# Patient Record
Sex: FEMALE | Race: ASIAN | NOT HISPANIC OR LATINO | Employment: STUDENT | URBAN - METROPOLITAN AREA
[De-identification: names, ages, dates, MRNs, and addresses within clinical notes are randomized per-mention and may not be internally consistent; named-entity substitution may affect disease eponyms.]

---
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
## 2021-02-26 ENCOUNTER — OFFICE VISIT (OUTPATIENT)
Dept: FAMILY MEDICINE | Facility: CLINIC | Age: 21
End: 2021-02-26
Payer: COMMERCIAL

## 2021-02-26 VITALS
SYSTOLIC BLOOD PRESSURE: 106 MMHG | HEART RATE: 74 BPM | WEIGHT: 89.56 LBS | OXYGEN SATURATION: 98 % | HEIGHT: 61 IN | BODY MASS INDEX: 16.91 KG/M2 | TEMPERATURE: 98.2 F | DIASTOLIC BLOOD PRESSURE: 70 MMHG

## 2021-02-26 DIAGNOSIS — F32.1 MAJOR DEPRESSIVE DISORDER, SINGLE EPISODE, MODERATE (H): ICD-10-CM

## 2021-02-26 DIAGNOSIS — Z23 NEED FOR PROPHYLACTIC VACCINATION AND INOCULATION AGAINST INFLUENZA: ICD-10-CM

## 2021-02-26 DIAGNOSIS — Z11.59 ENCOUNTER FOR HEPATITIS C SCREENING TEST FOR LOW RISK PATIENT: ICD-10-CM

## 2021-02-26 DIAGNOSIS — Z00.00 ROUTINE GENERAL MEDICAL EXAMINATION AT A HEALTH CARE FACILITY: Primary | ICD-10-CM

## 2021-02-26 DIAGNOSIS — R63.6 UNDERWEIGHT: ICD-10-CM

## 2021-02-26 DIAGNOSIS — Z11.4 ENCOUNTER FOR SCREENING FOR HIV: ICD-10-CM

## 2021-02-26 DIAGNOSIS — Z11.1 SCREENING EXAMINATION FOR PULMONARY TUBERCULOSIS: ICD-10-CM

## 2021-02-26 LAB
HCV AB SERPL QL IA: NONREACTIVE
HIV 1+2 AB+HIV1 P24 AG SERPL QL IA: NONREACTIVE

## 2021-02-26 PROCEDURE — 36415 COLL VENOUS BLD VENIPUNCTURE: CPT | Performed by: NURSE PRACTITIONER

## 2021-02-26 PROCEDURE — 99385 PREV VISIT NEW AGE 18-39: CPT | Mod: 25 | Performed by: NURSE PRACTITIONER

## 2021-02-26 PROCEDURE — 86481 TB AG RESPONSE T-CELL SUSP: CPT | Performed by: NURSE PRACTITIONER

## 2021-02-26 PROCEDURE — 86803 HEPATITIS C AB TEST: CPT | Performed by: NURSE PRACTITIONER

## 2021-02-26 PROCEDURE — 90686 IIV4 VACC NO PRSV 0.5 ML IM: CPT | Performed by: NURSE PRACTITIONER

## 2021-02-26 PROCEDURE — 90471 IMMUNIZATION ADMIN: CPT | Performed by: NURSE PRACTITIONER

## 2021-02-26 PROCEDURE — 87389 HIV-1 AG W/HIV-1&-2 AB AG IA: CPT | Performed by: NURSE PRACTITIONER

## 2021-02-26 ASSESSMENT — ENCOUNTER SYMPTOMS
PALPITATIONS: 0
HEMATOCHEZIA: 0
HEMATURIA: 0
EYE PAIN: 0
FEVER: 0
DYSURIA: 0
DIZZINESS: 0
CHILLS: 0
HEADACHES: 0
MYALGIAS: 0
CONSTIPATION: 0
SHORTNESS OF BREATH: 0
WEAKNESS: 0
SORE THROAT: 0
COUGH: 0
DIARRHEA: 0
ABDOMINAL PAIN: 0
NAUSEA: 0
ARTHRALGIAS: 0
FREQUENCY: 0
HEARTBURN: 0
JOINT SWELLING: 0
PARESTHESIAS: 0
NERVOUS/ANXIOUS: 1

## 2021-02-26 ASSESSMENT — PATIENT HEALTH QUESTIONNAIRE - PHQ9
10. IF YOU CHECKED OFF ANY PROBLEMS, HOW DIFFICULT HAVE THESE PROBLEMS MADE IT FOR YOU TO DO YOUR WORK, TAKE CARE OF THINGS AT HOME, OR GET ALONG WITH OTHER PEOPLE: SOMEWHAT DIFFICULT
SUM OF ALL RESPONSES TO PHQ QUESTIONS 1-9: 7
SUM OF ALL RESPONSES TO PHQ QUESTIONS 1-9: 7

## 2021-02-26 ASSESSMENT — ANXIETY QUESTIONNAIRES
7. FEELING AFRAID AS IF SOMETHING AWFUL MIGHT HAPPEN: SEVERAL DAYS
1. FEELING NERVOUS, ANXIOUS, OR ON EDGE: MORE THAN HALF THE DAYS
GAD7 TOTAL SCORE: 10
GAD7 TOTAL SCORE: 10
2. NOT BEING ABLE TO STOP OR CONTROL WORRYING: MORE THAN HALF THE DAYS
4. TROUBLE RELAXING: SEVERAL DAYS
6. BECOMING EASILY ANNOYED OR IRRITABLE: SEVERAL DAYS
GAD7 TOTAL SCORE: 10
7. FEELING AFRAID AS IF SOMETHING AWFUL MIGHT HAPPEN: SEVERAL DAYS
3. WORRYING TOO MUCH ABOUT DIFFERENT THINGS: MORE THAN HALF THE DAYS
5. BEING SO RESTLESS THAT IT IS HARD TO SIT STILL: SEVERAL DAYS

## 2021-02-26 ASSESSMENT — MIFFLIN-ST. JEOR: SCORE: 1118.63
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
## 2021-02-26 NOTE — LETTER
My Depression Action Plan  Name: Mery Doyle   Date of Birth 2000  Date: 2/26/2021    My doctor: [facility]   My clinic: [address] [address] [phone]          GREEN    ZONE   Good Control    What it looks like:     Things are going generally well. You have normal ups and downs. You may even feel depressed from time to time, but bad moods usually last less than a day.   What you need to do:  1. Continue to care for yourself (see self care plan)  2. Check your depression survival kit and update it as needed  3. Follow your physician s recommendations including any medication.  4. Do not stop taking medication unless you consult with your physician first.           YELLOW         ZONE Getting Worse    What it looks like:     Depression is starting to interfere with your life.     It may be hard to get out of bed; you may be starting to isolate yourself from others.    Symptoms of depression are starting to last most all day and this has happened for several days.     You may have suicidal thoughts but they are not constant.   What you need to do:     1. Call your care team. Your response to treatment will improve if you keep your care team informed of your progress. Yellow periods are signs an adjustment may need to be made.     2. Continue your self-care.  Just get dressed and ready for the day.  Don't give yourself time to talk yourself out of it.    3. Talk to someone in your support network.    4. Open up your Depression Self-Care Plan/Wellness Kit.           RED    ZONE Medical Alert - Get Help    What it looks like:     Depression is seriously interfering with your life.     You may experience these or other symptoms: You can t get out of bed most days, can t work or engage in other necessary activities, you have trouble taking care of basic hygiene, or basic responsibilities, thoughts of suicide or  death that will not go away, self-injurious behavior.     What you need to do:  1. Call your care team and request a same-day appointment. If they are not available (weekends or after hours) call your local crisis line, emergency room or 911.          Depression Self-Care Plan / Wellness Kit    Many people find that medication and therapy are helpful treatments for managing depression. In addition, making small changes to your everyday life can help to boost your mood and improve your wellbeing. Below are some tips for you to consider. Be sure to talk with your medical provider and/or behavioral health consultant if your symptoms are worsening or not improving.     Sleep   Sleep hygiene  means all of the habits that support good, restful sleep. It includes maintaining a consistent bedtime and wake time, using your bedroom only for sleeping or sex, and keeping the bedroom dark and free of distractions like a computer, smartphone, or television.     Develop a Healthy Routine  Maintain good hygiene. Get out of bed in the morning, make your bed, brush your teeth, take a shower, and get dressed. Don t spend too much time viewing media that makes you feel stressed. Find time to relax each day.    Exercise  Get some form of exercise every day. This will help reduce pain and release endorphins, the  feel good  chemicals in your brain. It can be as simple as just going for a walk or doing some gardening, anything that will get you moving.      Diet  Strive to eat healthy foods, including fruits and vegetables. Drink plenty of water. Avoid excessive sugar, caffeine, alcohol, and other mood-altering substances.     Stay Connected with Others  Stay in touch with friends and family members.    Manage Your Mood  Try deep breathing, massage therapy, biofeedback, or meditation. Take part in fun activities when you can. Try to find something to smile about each day.     Psychotherapy  Be open to working with a therapist if your  provider recommends it.     Medication  Be sure to take your medication as prescribed. Most anti-depressants need to be taken every day. It usually takes several weeks for medications to work. Not all medicines work for all people. It is important to follow-up with your provider to make sure you have a treatment plan that is working for you. Do not stop your medication abruptly without first discussing it with your provider.    Crisis Resources   These hotlines are for both adults and children. They and are open 24 hours a day, 7 days a week unless noted otherwise.      National Suicide Prevention Lifeline   3-281-673-WFHL (4960)      Crisis Text Line    www.crisistextline.org  Text HOME to 744047 from anywhere in the [place], anytime, about any type of crisis. A live, trained crisis counselor will receive the text and respond quickly.      [place] Lifeline for LGBTQ Youth  A national crisis intervention and suicide lifeline for LGBTQ youth under 25. Provides a safe place to talk without judgement. Call 1-599.250.7610; text START to 775631 or visit www.thetrevorproject.org to talk to a trained counselor.      For [facility] crisis numbers, visit the [facility] website at:  https://[place].gov/dhs/people-we-serve/adults/health-care/mental-health/resources/crisis-contacts.jsp

## 2021-02-26 NOTE — LETTER
My Asthma Action Plan    Name: Mery Doyle   YOB: 2000  Date: 2/26/2021   My doctor: MAI Mujica CNP   My clinic: [facility]        My Rescue Medicine:   Albuterol inhaler (Proair/Ventolin/Proventil HFA)  2-4 puffs EVERY 4 HOURS as needed. Use a spacer if recommended by your provider.   My Asthma Severity:   Intermittent / Exercise Induced  Know your asthma triggers: none             GREEN ZONE   Good Control    I feel good    No cough or wheeze    Can work, sleep and play without asthma symptoms       Take your asthma control medicine every day.     1. If exercise triggers your asthma, take your rescue medication    15 minutes before exercise or sports, and    During exercise if you have asthma symptoms  2. Spacer to use with inhaler: If you have a spacer, make sure to use it with your inhaler             YELLOW ZONE Getting Worse  I have ANY of these:    I do not feel good    Cough or wheeze    Chest feels tight    Wake up at night   1. Keep taking your Green Zone medications  2. Start taking your rescue medicine:    every 20 minutes for up to 1 hour. Then every 4 hours for 24-48 hours.  3. If you stay in the Yellow Zone for more than 12-24 hours, contact your doctor.  4. If you do not return to the Green Zone in 12-24 hours or you get worse, start taking your oral steroid medicine if prescribed by your provider.           RED ZONE Medical Alert - Get Help  I have ANY of these:    I feel awful    Medicine is not helping    Breathing getting harder    Trouble walking or talking    Nose opens wide to breathe       1. Take your rescue medicine NOW  2. If your provider has prescribed an oral steroid medicine, start taking it NOW  3. Call your doctor NOW  4. If you are still in the Red Zone after 20 minutes and you have not reached your doctor:    Take your rescue medicine again and    Call 911 or go to the emergency room right away    See your regular doctor within 2  weeks of an Emergency Room or Urgent Care visit for follow-up treatment.          Annual Reminders:  Meet with Asthma Educator,  Flu Shot in the Fall, consider Pneumonia Vaccination for patients with asthma (aged 19 and older).    Pharmacy:    [id number] - [place], [place] - [address] [facility] [place] [address] [facility] [place], [place] [address] [facility] [address]  [id number] - [facility] [address] AT [facility] [address]    Electronically signed by MAI Mujica CNP   Date: 02/26/21                    Asthma Triggers  How To Control Things That Make Your Asthma Worse    Triggers are things that make your asthma worse.  Look at the list below to help you find your triggers and   what you can do about them. You can help prevent asthma flare-ups by staying away from your triggers.      Trigger                                                          What you can do   Cigarette Smoke  Tobacco smoke can make asthma worse. Do not allow smoking in your home, car or around you.  Be sure no one smokes at a child s day care or school.  If you smoke, ask your health care provider for ways to help you quit.  Ask family members to quit too.  Ask your health care provider for a referral to Quit Plan to help you quit smoking, or call 3-680-948-PLAN.     Colds, Flu, Bronchitis  These are common triggers of asthma. Wash your hands often.  Don t touch your eyes, nose or mouth.  Get a flu shot every year.     Dust Mites  These are tiny bugs that live in cloth or carpet. They are too small to see. Wash sheets and blankets in hot water every week.   Encase pillows and mattress in dust mite proof covers.  Avoid having carpet if you can. If you have carpet, vacuum weekly.   Use a dust mask and HEPA vacuum.   Pollen and Outdoor Mold  Some people are allergic to trees, grass, or weed pollen, or molds. Try to keep your windows  closed.  Limit time out doors when pollen count is high.   Ask you health care provider about taking medicine during allergy season.     Animal Dander  Some people are allergic to skin flakes, urine or saliva from pets with fur or feathers. Keep pets with fur or feathers out of your home.    If you can t keep the pet outdoors, then keep the pet out of your bedroom.  Keep the bedroom door closed.  Keep pets off cloth furniture and away from stuffed toys.     Mice, Rats, and Cockroaches  Some people are allergic to the waste from these pests.   Cover food and garbage.  Clean up spills and food crumbs.  Store grease in the refrigerator.   Keep food out of the bedroom.   Indoor Mold  This can be a trigger if your home has high moisture. Fix leaking faucets, pipes, or other sources of water.   Clean moldy surfaces.  Dehumidify basement if it is damp and smelly.   Smoke, Strong Odors, and Sprays  These can reduce air quality. Stay away from strong odors and sprays, such as perfume, powder, hair spray, paints, smoke incense, paint, cleaning products, candles and new carpet.   Exercise or Sports  Some people with asthma have this trigger. Be active!  Ask your doctor about taking medicine before sports or exercise to prevent symptoms.    Warm up for 5-10 minutes before and after sports or exercise.     Other Triggers of Asthma  Cold air:  Cover your nose and mouth with a scarf.  Sometimes laughing or crying can be a trigger.  Some medicines and food can trigger asthma.

## 2021-02-27 ASSESSMENT — ANXIETY QUESTIONNAIRES: GAD7 TOTAL SCORE: 10

## 2021-02-27 ASSESSMENT — ASTHMA QUESTIONNAIRES: ACT_TOTALSCORE: 25

## 2021-03-01 LAB
GAMMA INTERFERON BACKGROUND BLD IA-ACNC: 0.2 IU/ML
M TB IFN-G CD4+ BCKGRND COR BLD-ACNC: 9.8 IU/ML
M TB TUBERC IFN-G BLD QL: NEGATIVE
MITOGEN IGNF BCKGRD COR BLD-ACNC: 0 IU/ML
MITOGEN IGNF BCKGRD COR BLD-ACNC: 0 IU/ML

## 2021-03-11 ENCOUNTER — OFFICE VISIT (OUTPATIENT)
Dept: FAMILY MEDICINE | Facility: CLINIC | Age: 21
End: 2021-03-11
Payer: COMMERCIAL

## 2021-03-11 VITALS
OXYGEN SATURATION: 100 % | WEIGHT: 91.38 LBS | TEMPERATURE: 98.8 F | SYSTOLIC BLOOD PRESSURE: 110 MMHG | BODY MASS INDEX: 17.27 KG/M2 | DIASTOLIC BLOOD PRESSURE: 76 MMHG | HEART RATE: 74 BPM

## 2021-03-11 DIAGNOSIS — Z01.84 IMMUNITY STATUS TESTING: Primary | ICD-10-CM

## 2021-03-11 LAB
HBV CORE AB SERPL QL IA: NONREACTIVE
HBV SURFACE AB SERPL IA-ACNC: 3.21 M[IU]/ML
HBV SURFACE AG SERPL QL IA: NONREACTIVE

## 2021-03-11 PROCEDURE — 99213 OFFICE O/P EST LOW 20 MIN: CPT | Performed by: PHYSICIAN ASSISTANT

## 2021-03-11 PROCEDURE — 99000 SPECIMEN HANDLING OFFICE-LAB: CPT | Performed by: PHYSICIAN ASSISTANT

## 2021-03-11 PROCEDURE — 87340 HEPATITIS B SURFACE AG IA: CPT | Performed by: PHYSICIAN ASSISTANT

## 2021-03-11 PROCEDURE — 86762 RUBELLA ANTIBODY: CPT | Performed by: PHYSICIAN ASSISTANT

## 2021-03-11 PROCEDURE — 86765 RUBEOLA ANTIBODY: CPT | Performed by: PHYSICIAN ASSISTANT

## 2021-03-11 PROCEDURE — 86735 MUMPS ANTIBODY: CPT | Mod: 90 | Performed by: PHYSICIAN ASSISTANT

## 2021-03-11 PROCEDURE — 86706 HEP B SURFACE ANTIBODY: CPT | Performed by: PHYSICIAN ASSISTANT

## 2021-03-11 PROCEDURE — 36415 COLL VENOUS BLD VENIPUNCTURE: CPT | Performed by: PHYSICIAN ASSISTANT

## 2021-03-11 PROCEDURE — 86704 HEP B CORE ANTIBODY TOTAL: CPT | Performed by: PHYSICIAN ASSISTANT

## 2021-03-11 NOTE — PROGRESS NOTES
Assessment & Plan     Immunity status testing    Immune testing ordered.    - Hepatitis B Surface Antibody  - Hepatitis B core antibody  - Hepatitis B surface antigen  - Rubella Antibody IgG Quantitative  - Rubeola Antibody IgG  - Mumps Immune Status, IgG             There are no Patient Instructions on file for this visit.    No follow-ups on file.    YELITZA Duque [facility] IDRIS Ordonez is a 20 year old who presents for the following health issues     HPI       Pt needs titers done to prove immunity to hep b, and mmr. She has vaccine records but school needs blood tests to prove immunity.        Review of Systems   Constitutional, HEENT, cardiovascular, pulmonary, gi and gu systems are negative, except as otherwise noted.      Objective    /76 (BP Location: Right arm, Patient Position: Chair, Cuff Size: Adult Small)   Pulse 74   Temp 98.8  F (37.1  C) (Oral)   Wt 41.4 kg (91 lb 6 oz)   SpO2 100%   BMI 17.27 kg/m    Body mass index is 17.27 kg/m .       Physical Exam   GENERAL: healthy, alert and no distress  EYES: Eyes grossly normal to inspection, PERRL and conjunctivae and sclerae normal  MS: no gross musculoskeletal defects noted, no edema  SKIN: no suspicious lesions or rashes  NEURO: Normal strength and tone, mentation intact and speech normal  PSYCH: mentation appears normal, affect normal/bright    Labs pending.

## 2021-03-12 LAB
MEV IGG SER QL IA: 4.4 AI (ref 0–0.8)
MUV IGG SER QL IA: 2.6 AI (ref 0–0.8)
RUBV IGG SERPL IA-ACNC: 33 IU/ML

## 2021-03-15 ENCOUNTER — ALLIED HEALTH/NURSE VISIT (OUTPATIENT)
Dept: FAMILY MEDICINE | Facility: CLINIC | Age: 21
End: 2021-03-15
Payer: COMMERCIAL

## 2021-03-15 DIAGNOSIS — Z23 NEED FOR HEPATITIS B BOOSTER VACCINATION: Primary | ICD-10-CM

## 2021-03-15 PROCEDURE — 99207 PR NO CHARGE NURSE ONLY: CPT

## 2021-03-15 PROCEDURE — 90471 IMMUNIZATION ADMIN: CPT

## 2021-03-15 PROCEDURE — 90744 HEPB VACC 3 DOSE PED/ADOL IM: CPT

## 2021-09-18 ENCOUNTER — HEALTH MAINTENANCE LETTER (OUTPATIENT)
Age: 21
End: 2021-09-18

## 2022-04-30 ENCOUNTER — HEALTH MAINTENANCE LETTER (OUTPATIENT)
Age: 22
End: 2022-04-30

## 2022-07-13 ENCOUNTER — TELEPHONE (OUTPATIENT)
Dept: FAMILY MEDICINE | Facility: CLINIC | Age: 22
End: 2022-07-13

## 2022-07-13 NOTE — TELEPHONE ENCOUNTER
Patient Quality Outreach    Patient is due for the following:   Depression  -  PHQ-9 Needed    NEXT STEPS:   Schedule a yearly physical    Type of outreach:    Sent Webcom message.      Questions for provider review:    None     Sandra Aguilar, CMA

## 2022-09-20 ENCOUNTER — OFFICE VISIT (OUTPATIENT)
Dept: FAMILY MEDICINE | Facility: CLINIC | Age: 22
End: 2022-09-20
Payer: COMMERCIAL

## 2022-09-20 VITALS
SYSTOLIC BLOOD PRESSURE: 112 MMHG | OXYGEN SATURATION: 98 % | HEART RATE: 96 BPM | WEIGHT: 92 LBS | RESPIRATION RATE: 16 BRPM | TEMPERATURE: 98.8 F | BODY MASS INDEX: 17.38 KG/M2 | DIASTOLIC BLOOD PRESSURE: 74 MMHG

## 2022-09-20 DIAGNOSIS — E55.9 VITAMIN D DEFICIENCY: ICD-10-CM

## 2022-09-20 DIAGNOSIS — F41.1 GAD (GENERALIZED ANXIETY DISORDER): ICD-10-CM

## 2022-09-20 DIAGNOSIS — F32.1 MAJOR DEPRESSIVE DISORDER, SINGLE EPISODE, MODERATE (H): Primary | ICD-10-CM

## 2022-09-20 LAB
ALBUMIN SERPL-MCNC: 4.3 G/DL (ref 3.4–5)
ALP SERPL-CCNC: 52 U/L (ref 40–150)
ALT SERPL W P-5'-P-CCNC: 20 U/L (ref 0–50)
ANION GAP SERPL CALCULATED.3IONS-SCNC: 7 MMOL/L (ref 3–14)
AST SERPL W P-5'-P-CCNC: 15 U/L (ref 0–45)
BILIRUB SERPL-MCNC: 0.3 MG/DL (ref 0.2–1.3)
BUN SERPL-MCNC: 15 MG/DL (ref 7–30)
CALCIUM SERPL-MCNC: 8.9 MG/DL (ref 8.5–10.1)
CHLORIDE BLD-SCNC: 108 MMOL/L (ref 94–109)
CO2 SERPL-SCNC: 23 MMOL/L (ref 20–32)
CREAT SERPL-MCNC: 0.51 MG/DL (ref 0.52–1.04)
DEPRECATED CALCIDIOL+CALCIFEROL SERPL-MC: 11 UG/L (ref 20–75)
ERYTHROCYTE [DISTWIDTH] IN BLOOD BY AUTOMATED COUNT: 12.5 % (ref 10–15)
GFR SERPL CREATININE-BSD FRML MDRD: >90 ML/MIN/1.73M2
GLUCOSE BLD-MCNC: 100 MG/DL (ref 70–99)
HCT VFR BLD AUTO: 42.4 % (ref 35–47)
HGB BLD-MCNC: 14.5 G/DL (ref 11.7–15.7)
MCH RBC QN AUTO: 30.6 PG (ref 26.5–33)
MCHC RBC AUTO-ENTMCNC: 34.2 G/DL (ref 31.5–36.5)
MCV RBC AUTO: 90 FL (ref 78–100)
PLATELET # BLD AUTO: 223 10E3/UL (ref 150–450)
POTASSIUM BLD-SCNC: 4.7 MMOL/L (ref 3.4–5.3)
PROT SERPL-MCNC: 7.8 G/DL (ref 6.8–8.8)
RBC # BLD AUTO: 4.74 10E6/UL (ref 3.8–5.2)
SODIUM SERPL-SCNC: 138 MMOL/L (ref 133–144)
TSH SERPL DL<=0.005 MIU/L-ACNC: 1.14 MU/L (ref 0.4–4)
WBC # BLD AUTO: 7.1 10E3/UL (ref 4–11)

## 2022-09-20 PROCEDURE — 80053 COMPREHEN METABOLIC PANEL: CPT | Performed by: NURSE PRACTITIONER

## 2022-09-20 PROCEDURE — 82306 VITAMIN D 25 HYDROXY: CPT | Performed by: NURSE PRACTITIONER

## 2022-09-20 PROCEDURE — 85027 COMPLETE CBC AUTOMATED: CPT | Performed by: NURSE PRACTITIONER

## 2022-09-20 PROCEDURE — 99214 OFFICE O/P EST MOD 30 MIN: CPT | Performed by: NURSE PRACTITIONER

## 2022-09-20 PROCEDURE — 36415 COLL VENOUS BLD VENIPUNCTURE: CPT | Performed by: NURSE PRACTITIONER

## 2022-09-20 PROCEDURE — 84443 ASSAY THYROID STIM HORMONE: CPT | Performed by: NURSE PRACTITIONER

## 2022-09-20 RX ORDER — VENLAFAXINE HYDROCHLORIDE 75 MG/1
75 CAPSULE, EXTENDED RELEASE ORAL DAILY
Qty: 30 CAPSULE | Refills: 0 | Status: SHIPPED | OUTPATIENT
Start: 2022-09-20 | End: 2022-10-03

## 2022-09-20 RX ORDER — VENLAFAXINE HYDROCHLORIDE 37.5 MG/1
37.5 CAPSULE, EXTENDED RELEASE ORAL DAILY
Qty: 7 CAPSULE | Refills: 0 | Status: SHIPPED | OUTPATIENT
Start: 2022-09-20 | End: 2022-11-11 | Stop reason: DRUGHIGH

## 2022-09-20 ASSESSMENT — ANXIETY QUESTIONNAIRES
GAD7 TOTAL SCORE: 15
5. BEING SO RESTLESS THAT IT IS HARD TO SIT STILL: NEARLY EVERY DAY
3. WORRYING TOO MUCH ABOUT DIFFERENT THINGS: MORE THAN HALF THE DAYS
IF YOU CHECKED OFF ANY PROBLEMS ON THIS QUESTIONNAIRE, HOW DIFFICULT HAVE THESE PROBLEMS MADE IT FOR YOU TO DO YOUR WORK, TAKE CARE OF THINGS AT HOME, OR GET ALONG WITH OTHER PEOPLE: VERY DIFFICULT
8. IF YOU CHECKED OFF ANY PROBLEMS, HOW DIFFICULT HAVE THESE MADE IT FOR YOU TO DO YOUR WORK, TAKE CARE OF THINGS AT HOME, OR GET ALONG WITH OTHER PEOPLE?: VERY DIFFICULT
7. FEELING AFRAID AS IF SOMETHING AWFUL MIGHT HAPPEN: MORE THAN HALF THE DAYS
GAD7 TOTAL SCORE: 15
2. NOT BEING ABLE TO STOP OR CONTROL WORRYING: MORE THAN HALF THE DAYS
4. TROUBLE RELAXING: NEARLY EVERY DAY
7. FEELING AFRAID AS IF SOMETHING AWFUL MIGHT HAPPEN: MORE THAN HALF THE DAYS
1. FEELING NERVOUS, ANXIOUS, OR ON EDGE: MORE THAN HALF THE DAYS
GAD7 TOTAL SCORE: 15
6. BECOMING EASILY ANNOYED OR IRRITABLE: SEVERAL DAYS

## 2022-09-20 ASSESSMENT — PATIENT HEALTH QUESTIONNAIRE - PHQ9
SUM OF ALL RESPONSES TO PHQ QUESTIONS 1-9: 17
SUM OF ALL RESPONSES TO PHQ QUESTIONS 1-9: 17
10. IF YOU CHECKED OFF ANY PROBLEMS, HOW DIFFICULT HAVE THESE PROBLEMS MADE IT FOR YOU TO DO YOUR WORK, TAKE CARE OF THINGS AT HOME, OR GET ALONG WITH OTHER PEOPLE: SOMEWHAT DIFFICULT

## 2022-09-20 NOTE — PROGRESS NOTES
Assessment & Plan     Major depressive disorder, single episode, moderate (H)  CRISTIAN (generalized anxiety disorder)  Uncontrolled exacerbation. Fluoxetine in past without good benefits. Will trial SNRI, venlafaxine started. Discussed medication use, risks, benefits, and side effects.   Passive SI, no self harm plans.   Discussed and accepts [facility]/Psychology.   Requests labs as below.   Crisis information discussed.   - venlafaxine (EFFEXOR XR) 37.5 MG 24 hr capsule  Dispense: 7 capsule; Refill: 0  - venlafaxine (EFFEXOR XR) 75 MG 24 hr capsule  Dispense: 30 capsule; Refill: 0  - [facility] Referral  - CBC with platelets  - TSH with free T4 reflex  - Vitamin D Deficiency  - Comprehensive metabolic panel (BMP + Alb, Alk Phos, ALT, AST, Total. Bili, TP)  - CBC with platelets  - TSH with free T4 reflex  - Vitamin D Deficiency  - Comprehensive metabolic panel (BMP + Alb, Alk Phos, ALT, AST, Total. Bili, TP)             Depression Screening Follow Up    PHQ 9/20/2022   PHQ-9 Total Score 17   Q9: Thoughts of better off dead/self-harm past 2 weeks Several days   F/U: Thoughts of suicide or self-harm Yes   F/U: Self harm-plan No   F/U: Self-harm action No   F/U: Safety concerns No       No follow-ups on file.   Follow-up Visit   Expected date:  Oct 20, 2022 (Approximate)      Follow Up Appointment Details:     Follow-up with whom?: Me    Follow-Up for what?: Chronic Disease f/u    Chronic Disease f/u:  Anxiety  Depression       How?: In Person or Virtual                    MAI Mujica [facility] IDRIS Ordonez is a 22 year old, presenting for the following health issues:  Recheck Medication      History of Present Illness       Mental Health Follow-up:  Patient presents to follow-up on Depression & Anxiety.Patient's depression since last visit has been:  Medium  The patient is having other symptoms associated with depression.  Patient's anxiety since last visit  has been:  Worse  The patient is having other symptoms associated with anxiety.  Any significant life events: No  Patient is feeling anxious or having panic attacks.  Patient has no concerns about alcohol or drug use.    Hypothyroidism:     Since last visit, patient describes the following symptoms::  Anxiety, Depression, Fatigue and Weight loss    Weight loss::  Less than 5 lbs.    She eats 0-1 servings of fruits and vegetables daily.She consumes 1 sweetened beverage(s) daily.She exercises with enough effort to increase her heart rate 60 or more minutes per day.  She exercises with enough effort to increase her heart rate 5 days per week.   She is taking medications regularly.    Today's PHQ-9         PHQ-9 Total Score: 17    PHQ-9 Q9 Thoughts of better off dead/self-harm past 2 weeks :   Several days  Thoughts of suicide or self harm: (P) Yes  Self-harm Plan:   (P) No  Self-harm Action:     (P) No  Safety concerns for self or others: (P) No    How difficult have these problems made it for you to do your work, take care of things at home, or get along with other people: Somewhat difficult  Today's CRISTIAN-7 Score: 15     PHQ 12/26/2017 2/26/2021 9/20/2022   PHQ-9 Total Score 0 7 17   Q9: Thoughts of better off dead/self-harm past 2 weeks Not at all Not at all Several days   F/U: Thoughts of suicide or self-harm - - Yes   F/U: Self harm-plan - - No   F/U: Self-harm action - - No   F/U: Safety concerns - - No     CRISTIAN-7 SCORE 12/26/2017 2/26/2021 9/20/2022   Total Score - 10 (moderate anxiety) 15 (severe anxiety)   Total Score 0 10 15           Review of Systems   Constitutional, HEENT, cardiovascular, pulmonary, gi and gu systems are negative, except as otherwise noted.      Objective    /74 (BP Location: Right arm, Patient Position: Chair, Cuff Size: Adult Regular)   Pulse 96   Temp 98.8  F (37.1  C) (Oral)   Resp 16   Wt 41.7 kg (92 lb)   SpO2 98%   BMI 17.38 kg/m    Body mass index is 17.38  kg/m .  Physical Exam   GENERAL: healthy, alert and no distress  PSYCH: mentation appears normal, affect flat and tearful

## 2022-09-20 NOTE — PATIENT INSTRUCTIONS
Referral to Behavioral Health Clinician (BHC)    During our visit, I encouraged you talk with our Behavioral Health Clinician (BHC). A BHC would be a great addition to your care team and will support your whole health!    What is a Behavioral Health Clinician (BHC)?    A BHC is a licensed mental health therapist. They can help you when behaviors, emotions, stress or worries get in the way of your life or health. Your BHC will work with you and your primary care team. Together, you'll come up with a unique care plan that works for you!         What will happen when I meet with a BHC?    BHCs ask questions to better understand your concerns. They will provide brief, solution-focused therapy. They can also make referrals to a specialty therapist or other services to help you reach your goals.      How do I schedule an appointment with the [facility]?    Call [phone] and ask for a C appointment.     How much time will I spend with the [facility]?     First visits are 45-60 minutes.  Return visits are typically 20-30 minutes.         How does billing work?      Outpatient mental health services are billed to insurance. Most plans don't charge a second co-pay if you see your primary care provider (PCP) the same day. Please verify your coverage and ask about your copay.

## 2022-09-21 RX ORDER — ERGOCALCIFEROL 1.25 MG/1
50000 CAPSULE, LIQUID FILLED ORAL WEEKLY
Qty: 8 CAPSULE | Refills: 0 | Status: SHIPPED | OUTPATIENT
Start: 2022-09-21 | End: 2022-11-10

## 2022-10-02 ENCOUNTER — MYC MEDICAL ADVICE (OUTPATIENT)
Dept: FAMILY MEDICINE | Facility: CLINIC | Age: 22
End: 2022-10-02

## 2022-10-02 DIAGNOSIS — F32.1 MAJOR DEPRESSIVE DISORDER, SINGLE EPISODE, MODERATE (H): ICD-10-CM

## 2022-10-02 DIAGNOSIS — F41.1 GAD (GENERALIZED ANXIETY DISORDER): ICD-10-CM

## 2022-10-03 RX ORDER — VENLAFAXINE HYDROCHLORIDE 75 MG/1
75 CAPSULE, EXTENDED RELEASE ORAL DAILY
Qty: 30 CAPSULE | Refills: 0 | Status: SHIPPED | OUTPATIENT
Start: 2022-10-03 | End: 2022-10-18

## 2022-10-03 NOTE — TELEPHONE ENCOUNTER
Responded to pt nScaledt message.    T'd up med and pharmacy and routing refill request to refill pool to be processed in order.    Maribell Madera RN

## 2022-10-03 NOTE — TELEPHONE ENCOUNTER
#30 sent 9/20/22, adding 1 refill   Prescription approved per [facility] Refill Protocol.  Lou Ramos RN, BSN  [facility]

## 2022-10-11 ENCOUNTER — E-VISIT (OUTPATIENT)
Dept: FAMILY MEDICINE | Facility: CLINIC | Age: 22
End: 2022-10-11
Payer: COMMERCIAL

## 2022-10-11 ENCOUNTER — LAB (OUTPATIENT)
Dept: FAMILY MEDICINE | Facility: CLINIC | Age: 22
End: 2022-10-11
Attending: NURSE PRACTITIONER
Payer: COMMERCIAL

## 2022-10-11 DIAGNOSIS — R05.1 ACUTE COUGH: Primary | ICD-10-CM

## 2022-10-11 DIAGNOSIS — R05.1 ACUTE COUGH: ICD-10-CM

## 2022-10-11 DIAGNOSIS — R52 BODY ACHES: ICD-10-CM

## 2022-10-11 LAB — SARS-COV-2 RNA RESP QL NAA+PROBE: POSITIVE

## 2022-10-11 PROCEDURE — U0005 INFEC AGEN DETEC AMPLI PROBE: HCPCS

## 2022-10-11 PROCEDURE — 99421 OL DIG E/M SVC 5-10 MIN: CPT | Performed by: NURSE PRACTITIONER

## 2022-10-11 PROCEDURE — U0003 INFECTIOUS AGENT DETECTION BY NUCLEIC ACID (DNA OR RNA); SEVERE ACUTE RESPIRATORY SYNDROME CORONAVIRUS 2 (SARS-COV-2) (CORONAVIRUS DISEASE [COVID-19]), AMPLIFIED PROBE TECHNIQUE, MAKING USE OF HIGH THROUGHPUT TECHNOLOGIES AS DESCRIBED BY CMS-2020-01-R: HCPCS

## 2022-10-11 NOTE — LETTER
October 11, 2022      Mery Doyle  [id number]  [place] [address]        To Whom It May Concern:    Mery Doyle sent a message of illness on 10/11/2022.  Please excuse her  until 10/12/22 due to illness.        Sincerely,        MAI Mujica CNP

## 2022-10-18 ENCOUNTER — OFFICE VISIT (OUTPATIENT)
Dept: BEHAVIORAL HEALTH | Facility: CLINIC | Age: 22
End: 2022-10-18
Payer: COMMERCIAL

## 2022-10-18 DIAGNOSIS — F41.9 ANXIETY: Primary | ICD-10-CM

## 2022-10-18 DIAGNOSIS — F32.1 MAJOR DEPRESSIVE DISORDER, SINGLE EPISODE, MODERATE (H): ICD-10-CM

## 2022-10-18 DIAGNOSIS — F41.1 GAD (GENERALIZED ANXIETY DISORDER): ICD-10-CM

## 2022-10-18 PROCEDURE — 90837 PSYTX W PT 60 MINUTES: CPT | Performed by: SOCIAL WORKER

## 2022-10-18 RX ORDER — VENLAFAXINE HYDROCHLORIDE 75 MG/1
75 CAPSULE, EXTENDED RELEASE ORAL DAILY
Qty: 30 CAPSULE | Refills: 0 | Status: SHIPPED | OUTPATIENT
Start: 2022-10-18 | End: 2022-11-11

## 2022-10-18 ASSESSMENT — COLUMBIA-SUICIDE SEVERITY RATING SCALE - C-SSRS
TOTAL  NUMBER OF INTERRUPTED ATTEMPTS LIFETIME: 1
REASONS FOR IDEATION LIFETIME: EQUALLY TO GET ATTENTION, REVENGE, OR A REACTION FROM OTHERS AND TO END/STOP THE PAIN
4. HAVE YOU HAD THESE THOUGHTS AND HAD SOME INTENTION OF ACTING ON THEM?: NO
5. HAVE YOU STARTED TO WORK OUT OR WORKED OUT THE DETAILS OF HOW TO KILL YOURSELF? DO YOU INTEND TO CARRY OUT THIS PLAN?: YES
1. IN THE PAST MONTH, HAVE YOU WISHED YOU WERE DEAD OR WISHED YOU COULD GO TO SLEEP AND NOT WAKE UP?: NO
REASONS FOR IDEATION PAST MONTH: DOES NOT APPLY
2. HAVE YOU ACTUALLY HAD ANY THOUGHTS OF KILLING YOURSELF?: NO
4. HAVE YOU HAD THESE THOUGHTS AND HAD SOME INTENTION OF ACTING ON THEM?: YES
3. HAVE YOU BEEN THINKING ABOUT HOW YOU MIGHT KILL YOURSELF?: YES
TOTAL  NUMBER OF INTERRUPTED ATTEMPTS PAST 3 MONTHS: NO
TOTAL  NUMBER OF INTERRUPTED ATTEMPTS LIFETIME: YES
ATTEMPT LIFETIME: NO
6. HAVE YOU EVER DONE ANYTHING, STARTED TO DO ANYTHING, OR PREPARED TO DO ANYTHING TO END YOUR LIFE?: NO
2. HAVE YOU ACTUALLY HAD ANY THOUGHTS OF KILLING YOURSELF?: YES
1. HAVE YOU WISHED YOU WERE DEAD OR WISHED YOU COULD GO TO SLEEP AND NOT WAKE UP?: YES
5. HAVE YOU STARTED TO WORK OUT OR WORKED OUT THE DETAILS OF HOW TO KILL YOURSELF? DO YOU INTEND TO CARRY OUT THIS PLAN?: NO
TOTAL  NUMBER OF ABORTED OR SELF INTERRUPTED ATTEMPTS LIFETIME: NO

## 2022-10-18 NOTE — PROGRESS NOTES
"[facility]: Integrated Behavioral Health  2022    Behavioral Health Clinician Progress Note    Patient Name: Mery Doyle           Service Type:  Individual      Service Location:   Phone call (patient / identified key support person reached)     Session Start Time: 1:05 p.m.  Session End Time: 2:00 p.m.      Session Length: 53 - 60      Attendees: Client     Service Modality:  Phone Visit:      Provider verified identity through the following two step process.  Patient provided:  Patient  and Patient address    The patient has been notified of the following:      \"We have found that certain health care needs can be provided without the need for a face to face visit.  This service lets us provide the care you need with a phone conversation.       I will have full access to your [facility] medical record during this entire phone call.   I will be taking notes for your medical record.      Since this is like an office visit, we will bill your insurance company for this service.       There are potential benefits and risks of telephone visits (e.g. limits to patient confidentiality) that differ from in-person visits.?Confidentiality still applies for telephone services, and nobody will record the visit.  It is important to be in a quiet, private space that is free of distractions (including cell phone or other devices) during the visit.??      If during the course of the call I believe a telephone visit is not appropriate, you will not be charged for this service\"     Consent has been obtained for this service by care team member: Yes     Visit Activities (Refresh list every visit): [facility] Only    Diagnostic Assessment Date:   Treatment Plan Review Date:   See Flowsheets for today's PHQ-9 and CRISTIAN-7 results  Previous PHQ-9:   PHQ-9 SCORE 2017   PHQ-9 Total Score MyChart - 7 (Mild depression) 17 (Moderately severe depression) " "  PHQ-9 Total Score 0 7 17     Previous CRISTIAN-7:   CRISTIAN-7 SCORE 12/26/2017 2/26/2021 9/20/2022   Total Score - 10 (moderate anxiety) 15 (severe anxiety)   Total Score 0 10 15       JHONNY LEVEL:  JHONNY Score (Last Two) 9/20/2022   JHONNY Raw Score 35   Activation Score 72.1   JHONNY Level 3       DATA  Extended Session (60+ minutes): No  Interactive Complexity: No  Crisis: No  [facility] Patient: No    Treatment Objective(s) Addressed in This Session:  Target Behavior(s): anxiety and depression    n/a- initial visit    Current Stressors / Issues:  Pt comes to writer as a referral from her PCP.  She states that she is seeking help for \"anxiety, depression, unresolved trauma.\"  She reportedly gets anxious in public meeting new people, in public speaking, little energy and motivation, anhedonia.   Pt reportedly has struggled with anxiety her whole life, but this increased in middle school.  Since that time, she's had times of higher and lower anxiety but that it's a constant in her life.  She reports excessive worry, psychomotor agitation, ruminations, excessive planning to try to calm herself.  Pt experiences stomachaches and muscle tension in her shoulders.  Denies headaches.  She has also been struggling more recently with memories that are distressing from her childhood and \"panic attacks,\" wherein she has stomachaches, feels restless, crying, emotionally dysregulated during these times. Alternatively, pt feels like she is \"completely shut down\" and is on \"autopilot mode.\"    Pt feels low energy, fatigue, some days she has difficulty getting out of bed, anhedonia.  She has little appetite but forces self to eat, has low self-worth \"not happy with myself.\"  She was experiencing insomnia with significant improvement since starting psychotropic medication.       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      \"My dad is a big reason why I came to therapy, but I wasn't honest\" about the issues in high school when " "she sought therapy.    We discussed pt's former SI during the pandemic and her self-rescue in a suicide attempt.  Pt has never felt suicidal since that time (2020) and has a safety plan she created with her therapist at the time. Pt declines creation of a safety plan today. She denies current SIB/SI/HI.      We discussed [facility] role and pt's desire to be treated for her trauma.  Explored options for finding and scheduling therapist for this.  Pt declined wanting to see past therapist within w.     Progress on Treatment Objective(s) / Homework:  n/a- initial visit    Also provided psychoeducation about behavioral health condition, symptoms, and treatment options    Care Plan review completed: No    Medication Review:  Changes to psychiatric medications, see updated Medication List in EPIC.     Medication Compliance:  Yes    Changes in Health Issues:   None reported    Chemical Use Review:   Substance Use: Problem use continues with no change since last session, Reviewed concerns related to health related substance abuse risk       Pt reportedly uses alcohol 1-2 hard ciders about 4 days of the week after work.  If she is spending time with friends, she drinks a bit more than 2 drinks (maybe 4 total in those times).  Pt uses THC daily after work.   Discussed THC and alcohol use and the link between those and depression and anxiety.  Pt feels like THC helps her \"get out of my mind and focus\" and she doesn't use either substance \"when I'm in a bad mood.\"  Pt is at pre-contemplative phase of change.   Social History     Tobacco Use     Smoking status: Passive Smoke Exposure - Never Smoker     Smokeless tobacco: Never     Tobacco comments:     dad smokes   Substance Use Topics     Alcohol use: No     Drug use: No     Comment: parent smokes      Tobacco Use: Yes, decrease.  Patient reports frequency of use daily use by vaping. Contemplation    Assessment: Current Emotional / Mental Status (status of significant " "symptoms):  Risk status (Self / Other harm or suicidal ideation)  Patient has had a history of suicidal ideation: last time was \"mid-Covid\" during lockdown, suicide attempts: mid-Covid \"lockdown\" was one time and self-injurious behavior: long boarding/skate boarding will be reckless  Patient denies current fears or concerns for personal safety.  Patient denies current or recent suicidal ideation or behaviors.  Patient denies current or recent homicidal ideation or behaviors.  Patient denies current or recent self injurious behavior or ideation.  Patient denies other safety concerns.  A safety and risk management plan has not been developed at this time, however patient was encouraged to call [place] [phone] should there be a change in any of these risk factors.    Appearance:   n/a-phone visit   Eye Contact:   n/a- phone visit   Psychomotor Behavior: n/a- phone visit   Attitude:   Cooperative  Pleasant  Orientation:   All  Speech   Rate / Production: Normal/ Responsive   Volume:  Normal   Mood:    Normal  Affect:    n/a- intial visit   Thought Content:  Clear   Thought Form:  Coherent  Logical   Insight:    Good     Diagnoses:  1. Anxiety        Collateral Reports Completed:  Routed note to PCP    Plan: (Homework, other):  Patient was given information about behavioral services and encouraged to schedule a follow up appointment with the clinic [facility] in 3 weeks. We will review options for community therapy to treat trauma.  She was also given information about mental health symptoms and treatment options .  CD Recommendations: Pt is pre-contemplative .      TAYLOR Vasquez, LICSW    "

## 2022-11-11 ENCOUNTER — OFFICE VISIT (OUTPATIENT)
Dept: FAMILY MEDICINE | Facility: CLINIC | Age: 22
End: 2022-11-11
Payer: COMMERCIAL

## 2022-11-11 VITALS
RESPIRATION RATE: 12 BRPM | TEMPERATURE: 97.9 F | OXYGEN SATURATION: 100 % | HEIGHT: 61 IN | WEIGHT: 94.5 LBS | SYSTOLIC BLOOD PRESSURE: 110 MMHG | BODY MASS INDEX: 17.84 KG/M2 | HEART RATE: 90 BPM | DIASTOLIC BLOOD PRESSURE: 72 MMHG

## 2022-11-11 DIAGNOSIS — Z23 HIGH PRIORITY FOR 2019-NCOV VACCINE: ICD-10-CM

## 2022-11-11 DIAGNOSIS — F32.1 MAJOR DEPRESSIVE DISORDER, SINGLE EPISODE, MODERATE (H): Primary | ICD-10-CM

## 2022-11-11 DIAGNOSIS — F41.1 GAD (GENERALIZED ANXIETY DISORDER): ICD-10-CM

## 2022-11-11 PROCEDURE — 90472 IMMUNIZATION ADMIN EACH ADD: CPT | Performed by: NURSE PRACTITIONER

## 2022-11-11 PROCEDURE — 90715 TDAP VACCINE 7 YRS/> IM: CPT | Performed by: NURSE PRACTITIONER

## 2022-11-11 PROCEDURE — 96127 BRIEF EMOTIONAL/BEHAV ASSMT: CPT | Performed by: NURSE PRACTITIONER

## 2022-11-11 PROCEDURE — 0124A COVID-19,PF,PFIZER BOOSTER BIVALENT: CPT | Performed by: NURSE PRACTITIONER

## 2022-11-11 PROCEDURE — 99214 OFFICE O/P EST MOD 30 MIN: CPT | Mod: 25 | Performed by: NURSE PRACTITIONER

## 2022-11-11 PROCEDURE — 91312 COVID-19,PF,PFIZER BOOSTER BIVALENT: CPT | Performed by: NURSE PRACTITIONER

## 2022-11-11 PROCEDURE — 90686 IIV4 VACC NO PRSV 0.5 ML IM: CPT | Performed by: NURSE PRACTITIONER

## 2022-11-11 PROCEDURE — 90471 IMMUNIZATION ADMIN: CPT | Performed by: NURSE PRACTITIONER

## 2022-11-11 RX ORDER — VENLAFAXINE HYDROCHLORIDE 75 MG/1
75 CAPSULE, EXTENDED RELEASE ORAL DAILY
Qty: 90 CAPSULE | Refills: 0 | Status: SHIPPED | OUTPATIENT
Start: 2022-11-11 | End: 2023-05-03

## 2022-11-11 ASSESSMENT — ANXIETY QUESTIONNAIRES
2. NOT BEING ABLE TO STOP OR CONTROL WORRYING: SEVERAL DAYS
GAD7 TOTAL SCORE: 6
7. FEELING AFRAID AS IF SOMETHING AWFUL MIGHT HAPPEN: SEVERAL DAYS
6. BECOMING EASILY ANNOYED OR IRRITABLE: SEVERAL DAYS
GAD7 TOTAL SCORE: 6
IF YOU CHECKED OFF ANY PROBLEMS ON THIS QUESTIONNAIRE, HOW DIFFICULT HAVE THESE PROBLEMS MADE IT FOR YOU TO DO YOUR WORK, TAKE CARE OF THINGS AT HOME, OR GET ALONG WITH OTHER PEOPLE: SOMEWHAT DIFFICULT
1. FEELING NERVOUS, ANXIOUS, OR ON EDGE: SEVERAL DAYS
GAD7 TOTAL SCORE: 6
8. IF YOU CHECKED OFF ANY PROBLEMS, HOW DIFFICULT HAVE THESE MADE IT FOR YOU TO DO YOUR WORK, TAKE CARE OF THINGS AT HOME, OR GET ALONG WITH OTHER PEOPLE?: SOMEWHAT DIFFICULT
7. FEELING AFRAID AS IF SOMETHING AWFUL MIGHT HAPPEN: SEVERAL DAYS
5. BEING SO RESTLESS THAT IT IS HARD TO SIT STILL: SEVERAL DAYS
4. TROUBLE RELAXING: NOT AT ALL
3. WORRYING TOO MUCH ABOUT DIFFERENT THINGS: SEVERAL DAYS

## 2022-11-11 ASSESSMENT — PATIENT HEALTH QUESTIONNAIRE - PHQ9
10. IF YOU CHECKED OFF ANY PROBLEMS, HOW DIFFICULT HAVE THESE PROBLEMS MADE IT FOR YOU TO DO YOUR WORK, TAKE CARE OF THINGS AT HOME, OR GET ALONG WITH OTHER PEOPLE: SOMEWHAT DIFFICULT
SUM OF ALL RESPONSES TO PHQ QUESTIONS 1-9: 11
SUM OF ALL RESPONSES TO PHQ QUESTIONS 1-9: 11

## 2022-11-11 NOTE — PROGRESS NOTES
Assessment & Plan     Major depressive disorder, single episode, moderate (H)  CRISTIAN (generalized anxiety disorder)  Improving. Feels good with where the medication has managed symptoms. Would like to continued current dose and continue work with counseling.   Discussed opportunity for future dose changes.   Encouraged activity.   - venlafaxine (EFFEXOR XR) 75 MG 24 hr capsule  Dispense: 90 capsule; Refill: 0                   No follow-ups on file.   Follow-up Visit   Expected date:  Dec 11, 2022 (Approximate)      Follow Up Appointment Details:     Follow-up with whom?: Me    Follow-Up for what?: Adult Preventive    How?: In Person                    MAI Mujica [employer] IDRIS Ordonez is a 22 year old, presenting for the following health issues:  Depression and Anxiety      History of Present Illness       Mental Health Follow-up:  Patient presents to follow-up on Depression & Anxiety.Patient's depression since last visit has been:  Good  The patient is not having other symptoms associated with depression.  Patient's anxiety since last visit has been:  Better  The patient is having other symptoms associated with anxiety.  Any significant life events: No  Patient is feeling anxious or having panic attacks.  Patient has no concerns about alcohol or drug use.    Vascular Disease:  She presents for follow up of vascular disease.  She never takes nitroglycerin. She is not taking daily aspirin.    She eats 2-3 servings of fruits and vegetables daily.She consumes 2 sweetened beverage(s) daily.She exercises with enough effort to increase her heart rate 60 or more minutes per day.  She exercises with enough effort to increase her heart rate 5 days per week.   She is taking medications regularly.    Today's PHQ-9         PHQ-9 Total Score: 11    PHQ-9 Q9 Thoughts of better off dead/self-harm past 2 weeks :   Not at all    How difficult have these problems made it for you to  "do your work, take care of things at home, or get along with other people: Somewhat difficult  Today's CRISTIAN-7 Score: 6         PHQ 2/26/2021 9/20/2022 11/11/2022   PHQ-9 Total Score 7 17 11   Q9: Thoughts of better off dead/self-harm past 2 weeks Not at all Several days Not at all   F/U: Thoughts of suicide or self-harm - Yes -   F/U: Self harm-plan - No -   F/U: Self-harm action - No -   F/U: Safety concerns - No -     CRISTIAN-7 SCORE 2/26/2021 9/20/2022 11/11/2022   Total Score 10 (moderate anxiety) 15 (severe anxiety) 6 (mild anxiety)   Total Score 10 15 6           Review of Systems   Constitutional, HEENT, cardiovascular, pulmonary, gi and gu systems are negative, except as otherwise noted.      Objective    /72 (BP Location: Right arm, Patient Position: Sitting, Cuff Size: Adult Regular)   Pulse 90   Temp 97.9  F (36.6  C) (Oral)   Resp 12   Ht 1.549 m (5' 1\")   Wt 42.9 kg (94 lb 8 oz)   SpO2 100%   BMI 17.86 kg/m    Body mass index is 17.86 kg/m .  Physical Exam   GENERAL: healthy, alert and no distress  RESP: regular rate and effort  PSYCH: mentation appears normal, affect normal/bright                    "

## 2023-03-17 ENCOUNTER — OFFICE VISIT (OUTPATIENT)
Dept: FAMILY MEDICINE | Facility: CLINIC | Age: 23
End: 2023-03-17
Payer: COMMERCIAL

## 2023-03-17 VITALS
SYSTOLIC BLOOD PRESSURE: 129 MMHG | BODY MASS INDEX: 19.34 KG/M2 | WEIGHT: 98.5 LBS | HEART RATE: 84 BPM | OXYGEN SATURATION: 100 % | TEMPERATURE: 98.7 F | DIASTOLIC BLOOD PRESSURE: 80 MMHG | HEIGHT: 60 IN | RESPIRATION RATE: 16 BRPM

## 2023-03-17 DIAGNOSIS — M79.10 MYALGIA: ICD-10-CM

## 2023-03-17 DIAGNOSIS — R61 NIGHT SWEATS: Primary | ICD-10-CM

## 2023-03-17 DIAGNOSIS — R53.83 OTHER FATIGUE: ICD-10-CM

## 2023-03-17 LAB
ALBUMIN SERPL BCG-MCNC: 4.8 G/DL (ref 3.5–5.2)
ALBUMIN UR-MCNC: ABNORMAL MG/DL
ALP SERPL-CCNC: 62 U/L (ref 35–104)
ALT SERPL W P-5'-P-CCNC: 74 U/L (ref 10–35)
ANION GAP SERPL CALCULATED.3IONS-SCNC: 12 MMOL/L (ref 7–15)
APPEARANCE UR: CLEAR
AST SERPL W P-5'-P-CCNC: 44 U/L (ref 10–35)
BACTERIA #/AREA URNS HPF: ABNORMAL /HPF
BASOPHILS # BLD AUTO: 0 10E3/UL (ref 0–0.2)
BASOPHILS NFR BLD AUTO: 0 %
BILIRUB SERPL-MCNC: 0.3 MG/DL
BILIRUB UR QL STRIP: NEGATIVE
BUN SERPL-MCNC: 14.7 MG/DL (ref 6–20)
CALCIUM SERPL-MCNC: 9.3 MG/DL (ref 8.6–10)
CHLORIDE SERPL-SCNC: 106 MMOL/L (ref 98–107)
COLOR UR AUTO: YELLOW
CREAT SERPL-MCNC: 0.46 MG/DL (ref 0.51–0.95)
CRP SERPL-MCNC: <3 MG/L
DEPRECATED HCO3 PLAS-SCNC: 24 MMOL/L (ref 22–29)
EOSINOPHIL # BLD AUTO: 0.1 10E3/UL (ref 0–0.7)
EOSINOPHIL NFR BLD AUTO: 1 %
ERYTHROCYTE [DISTWIDTH] IN BLOOD BY AUTOMATED COUNT: 12.3 % (ref 10–15)
ERYTHROCYTE [SEDIMENTATION RATE] IN BLOOD BY WESTERGREN METHOD: 8 MM/HR (ref 0–20)
GFR SERPL CREATININE-BSD FRML MDRD: >90 ML/MIN/1.73M2
GLUCOSE SERPL-MCNC: 91 MG/DL (ref 70–99)
GLUCOSE UR STRIP-MCNC: NEGATIVE MG/DL
HCT VFR BLD AUTO: 45 % (ref 35–47)
HGB BLD-MCNC: 14.8 G/DL (ref 11.7–15.7)
HGB UR QL STRIP: NEGATIVE
IMM GRANULOCYTES # BLD: 0 10E3/UL
IMM GRANULOCYTES NFR BLD: 0 %
KETONES UR STRIP-MCNC: 80 MG/DL
LEUKOCYTE ESTERASE UR QL STRIP: NEGATIVE
LYMPHOCYTES # BLD AUTO: 1.9 10E3/UL (ref 0.8–5.3)
LYMPHOCYTES NFR BLD AUTO: 31 %
MCH RBC QN AUTO: 30 PG (ref 26.5–33)
MCHC RBC AUTO-ENTMCNC: 32.9 G/DL (ref 31.5–36.5)
MCV RBC AUTO: 91 FL (ref 78–100)
MONOCYTES # BLD AUTO: 0.4 10E3/UL (ref 0–1.3)
MONOCYTES NFR BLD AUTO: 6 %
NEUTROPHILS # BLD AUTO: 3.8 10E3/UL (ref 1.6–8.3)
NEUTROPHILS NFR BLD AUTO: 62 %
NITRATE UR QL: NEGATIVE
PH UR STRIP: 8.5 [PH] (ref 5–8)
PLATELET # BLD AUTO: 244 10E3/UL (ref 150–450)
POTASSIUM SERPL-SCNC: 4.6 MMOL/L (ref 3.4–5.3)
PROT SERPL-MCNC: 7.7 G/DL (ref 6.4–8.3)
RBC # BLD AUTO: 4.94 10E6/UL (ref 3.8–5.2)
RBC #/AREA URNS AUTO: ABNORMAL /HPF
SODIUM SERPL-SCNC: 142 MMOL/L (ref 136–145)
SP GR UR STRIP: 1.01 (ref 1–1.03)
SQUAMOUS #/AREA URNS AUTO: ABNORMAL /LPF
TSH SERPL DL<=0.005 MIU/L-ACNC: 1.26 UIU/ML (ref 0.3–4.2)
UROBILINOGEN UR STRIP-ACNC: 1 E.U./DL
WBC # BLD AUTO: 6.2 10E3/UL (ref 4–11)
WBC #/AREA URNS AUTO: ABNORMAL /HPF

## 2023-03-17 PROCEDURE — 81001 URINALYSIS AUTO W/SCOPE: CPT | Performed by: FAMILY MEDICINE

## 2023-03-17 PROCEDURE — 87389 HIV-1 AG W/HIV-1&-2 AB AG IA: CPT | Performed by: FAMILY MEDICINE

## 2023-03-17 PROCEDURE — 85652 RBC SED RATE AUTOMATED: CPT | Performed by: FAMILY MEDICINE

## 2023-03-17 PROCEDURE — 86140 C-REACTIVE PROTEIN: CPT | Performed by: FAMILY MEDICINE

## 2023-03-17 PROCEDURE — 36415 COLL VENOUS BLD VENIPUNCTURE: CPT | Performed by: FAMILY MEDICINE

## 2023-03-17 PROCEDURE — 86481 TB AG RESPONSE T-CELL SUSP: CPT | Performed by: FAMILY MEDICINE

## 2023-03-17 PROCEDURE — 99214 OFFICE O/P EST MOD 30 MIN: CPT | Performed by: FAMILY MEDICINE

## 2023-03-17 PROCEDURE — 80050 GENERAL HEALTH PANEL: CPT | Performed by: FAMILY MEDICINE

## 2023-03-17 ASSESSMENT — ENCOUNTER SYMPTOMS
NAUSEA: 1
FATIGUE: 1

## 2023-03-17 ASSESSMENT — ANXIETY QUESTIONNAIRES
3. WORRYING TOO MUCH ABOUT DIFFERENT THINGS: SEVERAL DAYS
1. FEELING NERVOUS, ANXIOUS, OR ON EDGE: SEVERAL DAYS
2. NOT BEING ABLE TO STOP OR CONTROL WORRYING: SEVERAL DAYS
IF YOU CHECKED OFF ANY PROBLEMS ON THIS QUESTIONNAIRE, HOW DIFFICULT HAVE THESE PROBLEMS MADE IT FOR YOU TO DO YOUR WORK, TAKE CARE OF THINGS AT HOME, OR GET ALONG WITH OTHER PEOPLE: SOMEWHAT DIFFICULT
6. BECOMING EASILY ANNOYED OR IRRITABLE: NOT AT ALL
5. BEING SO RESTLESS THAT IT IS HARD TO SIT STILL: SEVERAL DAYS

## 2023-03-17 ASSESSMENT — PATIENT HEALTH QUESTIONNAIRE - PHQ9
5. POOR APPETITE OR OVEREATING: MORE THAN HALF THE DAYS
SUM OF ALL RESPONSES TO PHQ QUESTIONS 1-9: 15
SUM OF ALL RESPONSES TO PHQ QUESTIONS 1-9: 15
10. IF YOU CHECKED OFF ANY PROBLEMS, HOW DIFFICULT HAVE THESE PROBLEMS MADE IT FOR YOU TO DO YOUR WORK, TAKE CARE OF THINGS AT HOME, OR GET ALONG WITH OTHER PEOPLE: SOMEWHAT DIFFICULT

## 2023-03-17 NOTE — LETTER
March 17, 2023      Mery Doyle  [id number]  [place] [address]        To Whom It May Concern:    Mery Doyle  was seen on 03/17/23.  Please excuse from work due to visit.        Sincerely,        Daisha Khan, DO

## 2023-03-17 NOTE — PROGRESS NOTES
Assessment & Plan     1. Night sweats  2. Other fatigue  3. Myalgia  - Quantiferon TB Gold Plus  - CBC with platelets and differential  - TSH with free T4 reflex  - CRP inflammation  - ESR: Erythrocyte sedimentation rate  - Comprehensive metabolic panel (BMP + Alb, Alk Phos, ALT, AST, Total. Bili, TP)  - UA Macro with Reflex to Micro and Culture - lab collect  - HIV Antigen Antibody Combo  - UA Microscopic with Reflex to Culture     Concerning symptoms for 2+ months with drenching night sweats. Symptoms not correlated with onset of SNRI treatment.  Also very fatigued, nauseated, and complaining of diffuse body aches.  Abdomen diffusely tender to palpation, guarding present, no rigidity, no rebound tenderness, no palpable masses.   Proceed with evaluation per above. If labs normal, I would like to proceed with abdominal CT.   Recommend trial of OTC pepcid.        Follow-up Visit   Expected date:  Apr 17, 2023 (Approximate)      Follow Up Appointment Details:     Follow-up with whom?: PCP    Follow-Up for what?: Adult Preventive    How?: In Person                    Daisha Khan, [facility]    Rosie Ordonez is a 22 year old presenting for the following health issues:  Fatigue (x2-3 mo), Generalized Body Aches, Nausea, and Dizziness      Fatigue  Associated symptoms include fatigue and nausea.   Nausea  Associated symptoms include fatigue and nausea.   History of Present Illness       Reason for visit:  I feel like i got hit by a truck every morning I wake up. Im not sure if its my meds or something new    She eats 0-1 servings of fruits and vegetables daily.She consumes 3 sweetened beverage(s) daily.She exercises with enough effort to increase her heart rate 10 to 19 minutes per day.  She exercises with enough effort to increase her heart rate 6 days per week. She is missing 2 dose(s) of medications per week.  She is not taking prescribed medications regularly due to side  effects and remembering to take.    Today's PHQ-9         PHQ-9 Total Score: 15    PHQ-9 Q9 Thoughts of better off dead/self-harm past 2 weeks :   Not at all    How difficult have these problems made it for you to do your work, take care of things at home, or get along with other people: Somewhat difficult     Currently on venlafaxine for last 4-5 month.   GAD7: 7/21  PHQ 9/20/2022 11/11/2022 3/17/2023   PHQ-9 Total Score 17 11 15   Q9: Thoughts of better off dead/self-harm past 2 weeks Several days Not at all Not at all   F/U: Thoughts of suicide or self-harm Yes - -   F/U: Self harm-plan No - -   F/U: Self-harm action No - -   F/U: Safety concerns No - -       When she wakes up, feels like she has been hit by a truck. Hard to get up and go to work.   Onset 2-3 months ago with gradual worsening since. Feels this way every single morning.   Symptoms linger throughout the entire day. Feels low energy despite how much sleep she gets.     Had covid a while ago, no major changes 2-3 months ago with illnesses or life.     Working at [employer].     Nausea worst in the morning, wakes up and runs to the bathroom to vomit. No blood in vomit. Not every day, maybe once a week but not for a while. Not doing anything to help with nausea. Trying to drink gatorade and lay in bed.     Recent menses, cramps worse than normal.     Review of Systems   Constitutional: Positive for fatigue.   Gastrointestinal: Positive for nausea.      Night sweats, drenched in the middle of the night.   No unexplained weight loss but told she is losing weight.   No blurry vision or increased thirst. No sore throat, no ear pain, no congestion.   No chest pain. No heart racing.   No shortness of breath. No wheezing or coughing.   Does experience abdominal pain off to the left side. No changes in bowel movements.   No urinary symptoms.   Diffusely achy, worst in the left upper neck.   No joint swelling.   No skin rashes.         Objective    /80 (BP  "Location: Left arm, Patient Position: Sitting, Cuff Size: Adult Small)   Pulse 84   Temp 98.7  F (37.1  C) (Oral)   Resp 16   Ht 1.53 m (5' 0.25\")   Wt 44.7 kg (98 lb 8 oz)   LMP 03/13/2023 (Approximate)   SpO2 100%   BMI 19.08 kg/m    Body mass index is 19.08 kg/m .  Physical Exam   GENERAL: healthy, alert and no distress  EYES: Eyes grossly normal to inspection, PERRL and conjunctivae and sclerae normal  HENT: ear canals and TM's normal, nose and mouth without ulcers or lesions  NECK: no adenopathy, no asymmetry, masses, or scars and thyroid normal to palpation  RESP: lungs clear to auscultation - no rales, rhonchi or wheezes  CV: regular rate and rhythm, normal S1 S2, no S3 or S4, no murmur, click or rub, no peripheral edema and peripheral pulses strong  ABDOMEN: tenderness throughout and no palpable or pulsatile masses  MS: no gross musculoskeletal defects noted, no edema  PSYCH: mentation appears normal, affect normal/bright  LYMPH: no cervical, supraclavicular, axillary, or inguinal adenopathy                "

## 2023-03-20 LAB
GAMMA INTERFERON BACKGROUND BLD IA-ACNC: 0.16 IU/ML
HIV 1+2 AB+HIV1 P24 AG SERPL QL IA: NONREACTIVE
M TB IFN-G BLD-IMP: NEGATIVE
M TB IFN-G CD4+ BCKGRND COR BLD-ACNC: 9.84 IU/ML
MITOGEN IGNF BCKGRD COR BLD-ACNC: -0.07 IU/ML
MITOGEN IGNF BCKGRD COR BLD-ACNC: -0.08 IU/ML
QUANTIFERON MITOGEN: 10 IU/ML
QUANTIFERON NIL TUBE: 0.16 IU/ML
QUANTIFERON TB1 TUBE: 0.09 IU/ML
QUANTIFERON TB2 TUBE: 0.08

## 2023-03-20 NOTE — RESULT ENCOUNTER NOTE
Patient updated by Wiztango message with lab results.       Manoj Ordonez,  Your lab results have all returned. Thankfully, this is a very reassuring lab panel.  Your urine is likely contaminated with non-specific findings and your CMP shows a very mild elevation in your LFTs.   At the appointment, we had talked about a trial of Pepcid. Were you able to  this medicine to try? Please send me an update by Wiztango.   We had also discussed proceeding with imaging. With mildly elevated LFTs, nausea, and abdominal pain, I would consider moving forward with a CT scan of the abdomen if not improving with the Pepcid.  Please send me an update so we can discuss next steps.   Daisha Khan, DO

## 2023-03-22 ENCOUNTER — MYC MEDICAL ADVICE (OUTPATIENT)
Dept: FAMILY MEDICINE | Facility: CLINIC | Age: 23
End: 2023-03-22
Payer: COMMERCIAL

## 2023-03-22 DIAGNOSIS — R53.83 OTHER FATIGUE: ICD-10-CM

## 2023-03-22 DIAGNOSIS — R52 BODY ACHES: ICD-10-CM

## 2023-03-22 DIAGNOSIS — R61 NIGHT SWEATS: Primary | ICD-10-CM

## 2023-03-22 DIAGNOSIS — M79.10 MYALGIA: ICD-10-CM

## 2023-03-22 NOTE — TELEPHONE ENCOUNTER
1. Night sweats  2. Other fatigue  3. Myalgia  4. Body aches  - CT Abdomen Pelvis w Contrast; Future     Persistent symptoms, proceed with CT scan as outlined at visit.    RN - please triage headache and neck pain further? I recommended in person evaluation.    Daisha Khan, DO

## 2023-03-28 ENCOUNTER — OFFICE VISIT (OUTPATIENT)
Dept: PEDIATRICS | Facility: CLINIC | Age: 23
End: 2023-03-28
Payer: COMMERCIAL

## 2023-03-28 ENCOUNTER — ANCILLARY PROCEDURE (OUTPATIENT)
Dept: GENERAL RADIOLOGY | Facility: CLINIC | Age: 23
End: 2023-03-28
Attending: PEDIATRICS
Payer: COMMERCIAL

## 2023-03-28 VITALS
WEIGHT: 106.4 LBS | TEMPERATURE: 97.1 F | BODY MASS INDEX: 20.61 KG/M2 | HEART RATE: 87 BPM | SYSTOLIC BLOOD PRESSURE: 126 MMHG | DIASTOLIC BLOOD PRESSURE: 82 MMHG | OXYGEN SATURATION: 100 %

## 2023-03-28 DIAGNOSIS — K59.00 CONSTIPATION, UNSPECIFIED CONSTIPATION TYPE: ICD-10-CM

## 2023-03-28 DIAGNOSIS — R79.89 ELEVATED LFTS: ICD-10-CM

## 2023-03-28 DIAGNOSIS — R10.84 ABDOMINAL PAIN, GENERALIZED: Primary | ICD-10-CM

## 2023-03-28 PROCEDURE — 74019 RADEX ABDOMEN 2 VIEWS: CPT | Mod: TC | Performed by: RADIOLOGY

## 2023-03-28 PROCEDURE — 99215 OFFICE O/P EST HI 40 MIN: CPT | Performed by: PEDIATRICS

## 2023-03-28 RX ORDER — POLYETHYLENE GLYCOL 3350 17 G/17G
POWDER, FOR SOLUTION ORAL
Qty: 850 G | Refills: 1 | Status: SHIPPED | OUTPATIENT
Start: 2023-03-28

## 2023-03-28 NOTE — PROGRESS NOTES
Assessment & Plan     Abdominal pain, generalized  Likely secondary to constipation  - XR Abdomen 2 Views  - Adult GI  Referral - Consult Only; Future -referral placed for future, just in case the bowel cleanout does not resolve her pain    Elevated LFTs  Most likely transient and viral, will repeat in 2 to 3 months  - Adult GI  Referral - Consult Only; Future    Constipation, unspecified constipation type  - polyethylene glycol (MIRALAX) 17 GM/Dose powder; Miralax 17g 3 times daily for one day, Miralax 17 g (1 capful = 4 tsp) twice daily for 3 days, Then 17g once daily      46 minutes spent by me on the date of the encounter doing chart review, history and exam, documentation and further activities per the note           Joie Baez MD  [facility]    Rosie Ordonez is a 22 year old, presenting for the following health issues:  Musculoskeletal Problem    Additional Questions    Roomed by Brittney   Accompanied by Self     Musculoskeletal Problem         Mery has had abdominal pain for the last 3 months.  It is always located on her left side.  It feels like a stretching.  It used to come and go, but it has been more persistent and more severe in the last 2 weeks.  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    The pain is there at least half the time.  It does not seem to be related to eating or sleeping, but laying down helps.  The pain does not wake her from sleep.  It feels different from menstrual cramps.  She reports passing a stool daily but does have a history of constipation in the past.  Menses are regular normal not too heavy without significant cramping.  Denies urinary symptoms.  She is not currently sexually active, and has not been sexually active for at least 5 months.  She has had 2 prior partners both of them female.  No STD exposures or symptoms noted.    Family  history is reviewed today and noncontributory.    Mery does have a history of depression and reports that it is currently relatively well controlled.  She does not think it is related to her current pain.    She was evaluated for this 2 weeks ago, and had normal work-up with the exception of mildly elevated LFTs.  The work-up was reviewed by me today.    Mery also mentions that she gets occasional left-sided neck pain and headache that sometimes is accompanied by vomiting.    Review of Systems   Constitutional, HEENT, cardiovascular, pulmonary, gi and gu systems are negative, except as otherwise noted.      Objective    /82   Pulse 87   Temp 97.1  F (36.2  C) (Tympanic)   Wt 106 lb 6.4 oz (48.3 kg)   LMP 03/13/2023 (Approximate)   SpO2 100%   BMI 20.61 kg/m    Body mass index is 20.61 kg/m .  Physical Exam   GENERAL: healthy, alert and no distress  NECK: no adenopathy, no asymmetry, masses, or scars and thyroid normal to palpation  RESP: lungs clear to auscultation - no rales, rhonchi or wheezes  CV: regular rate and rhythm, normal S1 S2, no S3 or S4, no murmur, click or rub, no peripheral edema and peripheral pulses strong  ABDOMEN: soft, nontender, no hepatosplenomegaly, no masses and bowel sounds normal  ABDOMEN: Significant voluntary guarding noted, there is a suggestion of palpable masses underneath the flexed abdominal musculature.  When she moves about the room, or walks down the hallway she does not appear to be holding her abdomen rigid in pain  MS: no gross musculoskeletal defects noted, no edema  SKIN: no suspicious lesions or rashes  PSYCH: mentation appears normal, affect normal/bright  LYMPH: no cervical, supraclavicular, axillary, or inguinal adenopathy    XR Abdomen 2 Views    Result Date: 3/28/2023  ABDOMEN TWO-THREE VIEW March 28, 2023 10:13 AM HISTORY: Left-sided pain. Abdominal pain, generalized. COMPARISON: None. FINDINGS: Moderate to large amount of stool. No free air. There are no  air filled distended loops of small bowel. The colon is not distended. The lung bases are unremarkable.     IMPRESSION: Nonobstructed bowel gas pattern. CRUZITO JAMES MD   SYSTEM ID:  [id number]

## 2023-03-28 NOTE — PATIENT INSTRUCTIONS
The plan:    Abdominal X-ray today, if constipation noted will prescribe Miralax and follow up in 1 month.  Referred to GI for future appointment.  Follow up in 1 month regardless unless pain is gone.  If abdominal Xray normal and pain persists, would get abdominal US and labs(CMP, Celiac screen) unless GI visit is upcoming.    Constipation treatment plan:    Miralax 17g 3 times daily for one day, Miralax 17 g (1 capful = 4 tsp) twice daily for 3 days, Then 17g once daily  Continue treatment for at least 2 months.  When trying to stop, taper, by decreasing daily Miralax dose by 1 tsp per month.      Sit on the toilet for 10 minutes at least once a day.  Perhaps first thing in the morning, or after dinner.

## 2023-04-03 NOTE — TELEPHONE ENCOUNTER
REFERRAL INFORMATION:    Referring Provider:  Joie Baez MD    Referring Clinic:  [facility]  Reason for Visit/Diagnosis:   Abdominal pain, generalized   Elevated LFTs      FUTURE VISIT INFORMATION:    Appointment Date: 4/11/2023    Appointment Time:      NOTES STATUS DETAILS   OFFICE NOTE from Referring Provider Internal 3/28/2023 OV with DANIEL Baez   OFFICE NOTE from Other Specialist N/A    HOSPITAL DISCHARGE SUMMARY/  ED VISITS N/A    OPERATIVE REPORT N/A    MEDICATION LIST Internal         ENDOSCOPY  N/A    COLONOSCOPY N/A    ERCP N/A    EUS N/A    STOOL TESTING N/A    PERTINENT LABS N/A    PATHOLOGY REPORTS (RELATED) N/A    IMAGING (CT, MRI, EGD, MRCP, Small Bowel Follow Through/SBT, MR/CT Enterography) Internal 3/28/2023 XR ABD

## 2023-04-11 ENCOUNTER — PRE VISIT (OUTPATIENT)
Dept: GASTROENTEROLOGY | Facility: CLINIC | Age: 23
End: 2023-04-11

## 2023-04-11 ENCOUNTER — OFFICE VISIT (OUTPATIENT)
Dept: GASTROENTEROLOGY | Facility: CLINIC | Age: 23
End: 2023-04-11
Attending: PEDIATRICS
Payer: COMMERCIAL

## 2023-04-11 VITALS
DIASTOLIC BLOOD PRESSURE: 72 MMHG | HEIGHT: 60 IN | HEART RATE: 101 BPM | BODY MASS INDEX: 20.16 KG/M2 | WEIGHT: 102.7 LBS | SYSTOLIC BLOOD PRESSURE: 119 MMHG | OXYGEN SATURATION: 97 %

## 2023-04-11 DIAGNOSIS — R79.89 ELEVATED LFTS: ICD-10-CM

## 2023-04-11 DIAGNOSIS — R10.12 LUQ ABDOMINAL PAIN: Primary | ICD-10-CM

## 2023-04-11 LAB
ALBUMIN SERPL-MCNC: 4.5 G/DL (ref 3.4–5)
ALP SERPL-CCNC: 73 U/L (ref 40–150)
ALT SERPL W P-5'-P-CCNC: 39 U/L (ref 0–50)
AST SERPL W P-5'-P-CCNC: 21 U/L (ref 0–45)
BILIRUB DIRECT SERPL-MCNC: 0.1 MG/DL (ref 0–0.2)
BILIRUB SERPL-MCNC: 0.5 MG/DL (ref 0.2–1.3)
PROT SERPL-MCNC: 7.9 G/DL (ref 6.8–8.8)

## 2023-04-11 PROCEDURE — 99203 OFFICE O/P NEW LOW 30 MIN: CPT | Performed by: PHYSICIAN ASSISTANT

## 2023-04-11 PROCEDURE — 80076 HEPATIC FUNCTION PANEL: CPT | Performed by: PHYSICIAN ASSISTANT

## 2023-04-11 PROCEDURE — 36415 COLL VENOUS BLD VENIPUNCTURE: CPT | Performed by: PHYSICIAN ASSISTANT

## 2023-04-11 RX ORDER — OMEPRAZOLE 40 MG/1
40 CAPSULE, DELAYED RELEASE ORAL DAILY
Qty: 30 CAPSULE | Refills: 0 | Status: SHIPPED | OUTPATIENT
Start: 2023-04-11 | End: 2023-05-17

## 2023-04-11 ASSESSMENT — PAIN SCALES - GENERAL: PAINLEVEL: MILD PAIN (3)

## 2023-04-11 NOTE — PROGRESS NOTES
GI NEW PATIENT CLINIC VISIT     CC/REFERRING MD:    Clinic - [facility]  Joie Baez    REASON FOR CONSULTATION:   Referred by Joie Baez for New Patient (New consultation for ABD pain and elevated LFT)      HPI:  Mery Doyle is 22 year old female who presents for evaluation of left upper quadrant abdominal pain that started about 2 to 3 months ago.  No clear precipitating factors.    She does not have acid reflux or heartburn symptoms.  She has not seen a correlation between the pain and her diet although admits she may have a decreased appetite.  She did initially have nausea especially in the mornings but this is now improved after her primary care did a trial of Pepcid few weeks ago. No vomiting. There is sometimes improvements in the left upper quadrant pain after having a bowel movement but not always.  Her primary care provider checked an abdominal x-ray which was concerning for constipation and she was advised to start taking MiraLAX.  She reports that prior to MiraLAX she was having a [place] stool 3 or 4 daily.  She is now on MiraLAX 1 capful daily and continues to have a [place] 3 or 4 stool on a daily basis.     Her work-up with primary care also included blood work CBC, CMP, inflammatory markers and TSH.  She did have mildly elevated LFTs but the remainder of her work-up was unremarkable.    She has been taking ibuprofen several times a week to help with the pain.    She vapes and smokes weed daily and has done both of these things since age 17-18.  Denies alcohol.    No prior abdominal surgeries.  No pertinent family history.      Mery  has a past medical history of Asthma, Infectious mononucleosis (11/25/2011), Intermittent asthma (4/4/2011), Strep throat, and Tonsillar and adenoid hypertrophy (1/16/2013).    She  has a past surgical history that includes Tonsillectomy, adenoidectomy, combined (1/21/2013).    She  reports that she has never smoked. She has been exposed to tobacco  "smoke. She has never used smokeless tobacco. She reports current alcohol use. She reports current drug use. Drug: Marijuana.    Her family history includes Asthma in her maternal grandmother; Cerebrovascular Disease in her maternal grandfather; Diabetes in her maternal grandmother; Hypertension in her father and maternal grandmother; Lipids in her father; Myocardial Infarction in her maternal uncle.    ALLERGIES:     Allergies   Allergen Reactions     No Known Allergies        PERTINENT MEDICATIONS:    Current Outpatient Medications:      polyethylene glycol (MIRALAX) 17 GM/Dose powder, Miralax 17g 3 times daily for one day, Miralax 17 g (1 capful = 4 tsp) twice daily for 3 days, Then 17g once daily, Disp: 850 g, Rfl: 1     venlafaxine (EFFEXOR XR) 75 MG 24 hr capsule, Take 1 capsule (75 mg) by mouth daily, Disp: 90 capsule, Rfl: 0     Famotidine (PEPCID AC MAXIMUM STRENGTH PO), , Disp: , Rfl:       PHYSICAL EXAMINATION:  Constitutional: aaox3, cooperative, pleasant, not dyspneic/diaphoretic, no acute distress  Vitals reviewed: /72 (BP Location: Left arm, Patient Position: Sitting, Cuff Size: Adult Regular)   Pulse 101   Ht 1.53 m (5' 0.25\")   Wt 46.6 kg (102 lb 11.2 oz)   LMP 03/13/2023 (Approximate)   SpO2 97%   BMI 19.89 kg/m     Wt:   Wt Readings from Last 2 Encounters:   04/11/23 46.6 kg (102 lb 11.2 oz)   03/28/23 48.3 kg (106 lb 6.4 oz)        Eyes: Sclera anicteric/injected  CV: No edema, RRR  Respiratory: Unlabored breathing, CTAB  Abd: Nondistended, no masses, +bs, no hepatosplenomegaly, difficult to palpate abdomen due to patient tensing up.  May have some mild left-sided abdominal tenderness, no peritoneal signs  Skin:  no jaundice  Psych: Normal affect  MSK: Normal gait      PERTINENT STUDIES: (I personally reviewed these laboratory studies today)  Most recent CBC:   Recent Labs   Lab Test 03/17/23  1515 09/20/22  1425   WBC 6.2 7.1   HGB 14.8 14.5   HCT 45.0 42.4    223     Most " recent hepatic panel:  Recent Labs   Lab Test 03/17/23  1515 09/20/22  1425   ALT 74* 20   AST 44* 15       TSH   Date Value Ref Range Status   03/17/2023 1.26 0.30 - 4.20 uIU/mL Final   09/20/2022 1.14 0.40 - 4.00 mU/L Final         RADIOLOGY:      ABDOMEN TWO-THREE VIEW March 28, 2023 10:13 AM      HISTORY: Left-sided pain. Abdominal pain, generalized.     COMPARISON: None.     FINDINGS: Moderate to large amount of stool. No free air. There are no  air filled distended loops of small bowel. The colon is not distended.  The lung bases are unremarkable.                                                                      IMPRESSION: Nonobstructed bowel gas pattern.     CRUZITO JAMES MD            ASSESSMENT/PLAN:    1. LUQ abdominal pain  - Adult GI  Referral - Consult Only  - XR Esophagram w Upper GI; Future  - omeprazole (PRILOSEC) 40 MG DR capsule; Take 1 capsule (40 mg) by mouth daily  Dispense: 30 capsule; Refill: 0  - Adult GI Clinic Follow-Up Order (Blank); Future    2. Elevated LFTs  - Adult GI  Referral - Consult Only  - Hepatic function panel; Future  - Hepatic function panel      Mery Doyle is a 22 year old female who presents for evaluation of left upper quadrant abdominal pain.  She seems to have some improvement in the pain as well as nausea in the last few weeks since starting Pepcid.  Recommended switching this to omeprazole to see if this would further improve her symptoms.  We will also check an upper GI x-ray.  Advised to stop NSAIDs.    There is also question if the left upper quadrant abdominal pain could be related to her bowel movements.  She actually reports that she was moving her bowels pretty regularly prior to starting MiraLAX.  Now that she is on MiraLAX she continues to have daily bowel movements but does feel that she evacuated better.  Recommended continuing with MiraLAX.    Elevated LFTs -mild elevation in LFTs noted, likely transient.  Recommended  rechecking.      Follow-up in 1 month to check progress    Thank you for this consultation.  It was a pleasure to participate in the care of this patient; please contact us with any further questions.      38 minutes spent by me on the date of the encounter doing chart review, history and exam, documentation and further activities per the note    This note was created with voice recognition software, and while reviewed for accuracy, typos may remain.     Viraj Vásquez PA-C  Division of Gastroenterology, Hepatology and Nutrition   [facility]

## 2023-04-11 NOTE — NURSING NOTE
"Chief Complaint   Patient presents with     New Patient     New consultation for ABD pain and elevated LFT     She requests these members of her care team be copied on today's visit information: pcp    PCP: [facility]    Referring Provider:  Joie Baez MD  [address] [place] [address]    Vitals:    04/11/23 1241   BP: 119/72   BP Location: Left arm   Patient Position: Sitting   Cuff Size: Adult Regular   Pulse: 101   SpO2: 97%   Weight: 46.6 kg (102 lb 11.2 oz)   Height: 1.53 m (5' 0.25\")       Body mass index is 19.89 kg/m .    Medications were reconciled.    Does patient need any medication refills at today's visit? none      Shari Lewis CMA     "

## 2023-04-11 NOTE — LETTER
4/11/2023         RE: Mery Doyle  [id number]  [facility] [id number]        Dear Colleague,    Thank you for referring your patient, Mery Doyle, to the [facility]. Please see a copy of my visit note below.        GI NEW PATIENT CLINIC VISIT     CC/REFERRING MD:    Clinic - [facility]  Joie Baez    REASON FOR CONSULTATION:   Referred by Joie Baez for New Patient (New consultation for ABD pain and elevated LFT)      HPI:  Mery Doyle is 22 year old female who presents for evaluation of left upper quadrant abdominal pain that started about 2 to 3 months ago.  No clear precipitating factors.    She does not have acid reflux or heartburn symptoms.  She has not seen a correlation between the pain and her diet although admits she may have a decreased appetite.  She did initially have nausea especially in the mornings but this is now improved after her primary care did a trial of Pepcid few weeks ago. No vomiting. There is sometimes improvements in the left upper quadrant pain after having a bowel movement but not always.  Her primary care provider checked an abdominal x-ray which was concerning for constipation and she was advised to start taking MiraLAX.  She reports that prior to MiraLAX she was having a [place] stool 3 or 4 daily.  She is now on MiraLAX 1 capful daily and continues to have a [place] 3 or 4 stool on a daily basis.     Her work-up with primary care also included blood work CBC, CMP, inflammatory markers and TSH.  She did have mildly elevated LFTs but the remainder of her work-up was unremarkable.    She has been taking ibuprofen several times a week to help with the pain.    She vapes and smokes weed daily and has done both of these things since age 18-18.  Denies alcohol.    No prior abdominal surgeries.  No pertinent family history.      Mery  has a past medical history of Asthma, Infectious mononucleosis (11/25/2011), Intermittent asthma  "(4/4/2011), Strep throat, and Tonsillar and adenoid hypertrophy (1/16/2013).    She  has a past surgical history that includes Tonsillectomy, adenoidectomy, combined (1/21/2013).    She  reports that she has never smoked. She has been exposed to tobacco smoke. She has never used smokeless tobacco. She reports current alcohol use. She reports current drug use. Drug: Marijuana.    Her family history includes Asthma in her maternal grandmother; Cerebrovascular Disease in her maternal grandfather; Diabetes in her maternal grandmother; Hypertension in her father and maternal grandmother; Lipids in her father; Myocardial Infarction in her maternal uncle.    ALLERGIES:     Allergies   Allergen Reactions     No Known Allergies        PERTINENT MEDICATIONS:    Current Outpatient Medications:      polyethylene glycol (MIRALAX) 17 GM/Dose powder, Miralax 17g 3 times daily for one day, Miralax 17 g (1 capful = 4 tsp) twice daily for 3 days, Then 17g once daily, Disp: 850 g, Rfl: 1     venlafaxine (EFFEXOR XR) 75 MG 24 hr capsule, Take 1 capsule (75 mg) by mouth daily, Disp: 90 capsule, Rfl: 0     Famotidine (PEPCID AC MAXIMUM STRENGTH PO), , Disp: , Rfl:       PHYSICAL EXAMINATION:  Constitutional: aaox3, cooperative, pleasant, not dyspneic/diaphoretic, no acute distress  Vitals reviewed: /72 (BP Location: Left arm, Patient Position: Sitting, Cuff Size: Adult Regular)   Pulse 101   Ht 1.53 m (5' 0.25\")   Wt 46.6 kg (102 lb 11.2 oz)   LMP 03/13/2023 (Approximate)   SpO2 97%   BMI 19.89 kg/m     Wt:   Wt Readings from Last 2 Encounters:   04/11/23 46.6 kg (102 lb 11.2 oz)   03/28/23 48.3 kg (106 lb 6.4 oz)        Eyes: Sclera anicteric/injected  CV: No edema, RRR  Respiratory: Unlabored breathing, CTAB  Abd: Nondistended, no masses, +bs, no hepatosplenomegaly, difficult to palpate abdomen due to patient tensing up.  May have some mild left-sided abdominal tenderness, no peritoneal signs  Skin:  no jaundice  Psych: " Normal affect  MSK: Normal gait      PERTINENT STUDIES: (I personally reviewed these laboratory studies today)  Most recent CBC:   Recent Labs   Lab Test 03/17/23  1515 09/20/22  1425   WBC 6.2 7.1   HGB 14.8 14.5   HCT 45.0 42.4    223     Most recent hepatic panel:  Recent Labs   Lab Test 03/17/23  1515 09/20/22  1425   ALT 74* 20   AST 44* 15       TSH   Date Value Ref Range Status   03/17/2023 1.26 0.30 - 4.20 uIU/mL Final   09/20/2022 1.14 0.40 - 4.00 mU/L Final         RADIOLOGY:      ABDOMEN TWO-THREE VIEW March 28, 2023 10:13 AM      HISTORY: Left-sided pain. Abdominal pain, generalized.     COMPARISON: None.     FINDINGS: Moderate to large amount of stool. No free air. There are no  air filled distended loops of small bowel. The colon is not distended.  The lung bases are unremarkable.                                                                      IMPRESSION: Nonobstructed bowel gas pattern.     CRUZITO JAMES MD            ASSESSMENT/PLAN:    1. LUQ abdominal pain  - Adult GI  Referral - Consult Only  - XR Esophagram w Upper GI; Future  - omeprazole (PRILOSEC) 40 MG DR capsule; Take 1 capsule (40 mg) by mouth daily  Dispense: 30 capsule; Refill: 0  - Adult GI Clinic Follow-Up Order (Blank); Future    2. Elevated LFTs  - Adult GI  Referral - Consult Only  - Hepatic function panel; Future  - Hepatic function panel      Mery Doyle is a 22 year old female who presents for evaluation of left upper quadrant abdominal pain.  She seems to have some improvement in the pain as well as nausea in the last few weeks since starting Pepcid.  Recommended switching this to omeprazole to see if this would further improve her symptoms.  We will also check an upper GI x-ray.  Advised to stop NSAIDs.    There is also question if the left upper quadrant abdominal pain could be related to her bowel movements.  She actually reports that she was moving her bowels pretty regularly prior to starting  MiraLAX.  Now that she is on MiraLAX she continues to have daily bowel movements but does feel that she evacuated better.  Recommended continuing with MiraLAX.    Elevated LFTs -mild elevation in LFTs noted, likely transient.  Recommended rechecking.      Follow-up in 1 month to check progress    Thank you for this consultation.  It was a pleasure to participate in the care of this patient; please contact us with any further questions.      38 minutes spent by me on the date of the encounter doing chart review, history and exam, documentation and further activities per the note    This note was created with voice recognition software, and while reviewed for accuracy, typos may remain.     Viraj Vásquez PA-C  Division of Gastroenterology, Hepatology and Nutrition   [facility]         Again, thank you for allowing me to participate in the care of your patient.        Sincerely,        Viraj Vásquez PA-C

## 2023-04-11 NOTE — PATIENT INSTRUCTIONS
It was a pleasure taking care of you today.  I've included a brief summary of our discussion and care plan from today's visit below.  Please review this information with your primary care provider.  _______________________________________________________________________     My recommendations are summarized as follows:     - start omeprazole 40 mg. This is best taken every morning on an empty stomach about 30 minutes before breakfast  - you can continue pepcid as needed   - schedule an upper GI x-ray   - blood test today to recheck your liver enzyme    ______________________________________________________________________     How do I schedule labs, imaging studies, or procedures that were ordered in clinic today?      Labs: To schedule lab appointment you can contact your local [facility] or call [phone] to schedule at any convenient [facility] location.     Procedures: If a colonoscopy, upper endoscopy, breath test, esophageal manometry, or pH impedence was ordered today, our endoscopy team will call you to schedule this. If you have not heard from our endoscopy team within a week, please call [phone] to schedule.      Imaging Studies: If you were scheduled for a CT scan, X-ray, MRI, ultrasound, HIDA scan or other imaging study, please call [phone] to have this scheduled.      Referral: If a referral to another specialty was ordered, expect a phone call or follow instructions above. If you have not heard from anyone regarding your referral in a week, please call our clinic to check the status.      Who do I call with any questions after my visit?  Please be in touch if there are any further questions that arise following today's visit.  There are multiple ways to contact your gastroenterology care team.       During business hours, you may reach a Gastroenterology nurse at [phone]     To schedule or reschedule an appointment, please call [phone].      You can always send  a secure message through [employer].  [employer] messages are answered by your nurse or doctor typically within 24 hours.  Please allow extra time on weekends and holidays.       For urgent/emergent questions after business hours, you may reach the on-call GI Fellow by contacting the [facility]  at [phone].     How will I get the results of any tests ordered?    You will receive all of your results.  If you have signed up for [employer], any tests ordered at your visit will be available to you after your physician reviews them.  Typically this takes 1-2 weeks.  If there are urgent results that require a change in your care plan, your physician or nurse will call you to discuss the next steps.       What is [employer]?  [employer] is a secure way for you to access all of your healthcare records from the [facility].  It is a web based computer program, so you can sign on to it from any location.  It also allows you to send secure messages to your care team.  I recommend signing up for [employer] access if you have not already done so and are comfortable with using a computer.       How to I schedule a follow-up visit?  If you did not schedule a follow-up visit today, please call [phone] to schedule a follow-up office visit.      Viraj Vásquez PA-C  Division of Gastroenterology, Hepatology and Nutrition   [facility]

## 2023-04-18 ENCOUNTER — HOSPITAL ENCOUNTER (OUTPATIENT)
Dept: GENERAL RADIOLOGY | Facility: CLINIC | Age: 23
Discharge: HOME | End: 2023-04-18
Attending: PHYSICIAN ASSISTANT | Admitting: PHYSICIAN ASSISTANT
Payer: COMMERCIAL

## 2023-04-18 DIAGNOSIS — R10.12 LUQ ABDOMINAL PAIN: ICD-10-CM

## 2023-04-18 PROCEDURE — 74246 X-RAY XM UPR GI TRC 2CNTRST: CPT

## 2023-04-18 PROCEDURE — 250N000013 HC RX MED GY IP 250 OP 250 PS 637: Performed by: PHYSICIAN ASSISTANT

## 2023-04-18 RX ADMIN — ANTACID/ANTIFLATULENT 4 G: 380; 550; 10; 10 GRANULE, EFFERVESCENT ORAL at 09:12

## 2023-05-03 ENCOUNTER — TELEPHONE (OUTPATIENT)
Dept: FAMILY MEDICINE | Facility: CLINIC | Age: 23
End: 2023-05-03

## 2023-05-03 ENCOUNTER — OFFICE VISIT (OUTPATIENT)
Dept: FAMILY MEDICINE | Facility: CLINIC | Age: 23
End: 2023-05-03
Payer: COMMERCIAL

## 2023-05-03 VITALS
RESPIRATION RATE: 16 BRPM | DIASTOLIC BLOOD PRESSURE: 70 MMHG | SYSTOLIC BLOOD PRESSURE: 123 MMHG | TEMPERATURE: 99.2 F | WEIGHT: 103 LBS | HEART RATE: 96 BPM | BODY MASS INDEX: 19.45 KG/M2 | HEIGHT: 61 IN | OXYGEN SATURATION: 99 %

## 2023-05-03 DIAGNOSIS — F32.1 MAJOR DEPRESSIVE DISORDER, SINGLE EPISODE, MODERATE (H): ICD-10-CM

## 2023-05-03 DIAGNOSIS — R10.12 LUQ ABDOMINAL PAIN: Primary | ICD-10-CM

## 2023-05-03 DIAGNOSIS — F41.1 GAD (GENERALIZED ANXIETY DISORDER): ICD-10-CM

## 2023-05-03 PROCEDURE — 99213 OFFICE O/P EST LOW 20 MIN: CPT | Performed by: PHYSICIAN ASSISTANT

## 2023-05-03 RX ORDER — VENLAFAXINE HYDROCHLORIDE 75 MG/1
75 CAPSULE, EXTENDED RELEASE ORAL DAILY
Qty: 90 CAPSULE | Refills: 0 | Status: SHIPPED | OUTPATIENT
Start: 2023-05-03 | End: 2023-06-16

## 2023-05-03 ASSESSMENT — PAIN SCALES - GENERAL: PAINLEVEL: MILD PAIN (2)

## 2023-05-03 ASSESSMENT — PATIENT HEALTH QUESTIONNAIRE - PHQ9
10. IF YOU CHECKED OFF ANY PROBLEMS, HOW DIFFICULT HAVE THESE PROBLEMS MADE IT FOR YOU TO DO YOUR WORK, TAKE CARE OF THINGS AT HOME, OR GET ALONG WITH OTHER PEOPLE: SOMEWHAT DIFFICULT
SUM OF ALL RESPONSES TO PHQ QUESTIONS 1-9: 9
SUM OF ALL RESPONSES TO PHQ QUESTIONS 1-9: 9

## 2023-05-03 NOTE — PROGRESS NOTES
Assessment & Plan     LUQ abdominal pain  FMLA forms completed allowing to remain off work until Annie 15 2023 and further determination to be addressed at that time.     Major depressive disorder, single episode, moderate (H)  Refilled effexor for 3 months- has upcoming appointment with primary care within a month.  Will be working with a therapist. Wishes to continue at current dose   - venlafaxine (EFFEXOR XR) 75 MG 24 hr capsule  Dispense: 90 capsule; Refill: 0    CRISTIAN (generalized anxiety disorder)  Refilled effexor -will be working with a therapist. Upcoming appointment with primary care within a month   - venlafaxine (EFFEXOR XR) 75 MG 24 hr capsule  Dispense: 90 capsule; Refill: 0      Prescription drug management         Nicotine/Tobacco Cessation:  She reports that she has been smoking vaping device. She has been exposed to tobacco smoke. She has never used smokeless tobacco.  Nicotine/Tobacco Cessation Plan:   Information offered: Patient not interested at this time      Patient Instructions   Keep upcoming appointments as scheduled  Return urgently if any change in symptoms like increasing pain, fever, vomiting or other change in symptoms       Sera Hamilton PA-C  [facility] MAXIMUS Ordonez is a 22 year old, presenting for the following health issues:  Recheck Medication (Fill out FMLA paperwork/Medication refills)        5/3/2023     1:42 PM   Additional Questions   Roomed by Diane Lynne Schoenherr RN     Forms 5/3/2023   Any forms needing to be completed Yes     History of Present Illness       Reason for visit:  Filling out FMLA paperwork and medical refill    She eats 2-3 servings of fruits and vegetables daily.She consumes 1 sweetened beverage(s) daily.She exercises with enough effort to increase her heart rate 10 to 19 minutes per day.  She exercises with enough effort to increase her heart rate 3 or less days per week. She is missing 1 dose(s) of medications per  "week.  She is not taking prescribed medications regularly due to remembering to take.    Today's PHQ-9         PHQ-9 Total Score: 9    PHQ-9 Q9 Thoughts of better off dead/self-harm past 2 weeks :   Not at all    How difficult have these problems made it for you to do your work, take care of things at home, or get along with other people: Somewhat difficult   patient unknown to me presents for FMLA forms to be completed and refills of venlafaxine.   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    Trying to go back to work and can't due to abdominal pain.   Venlafaxine pretty well. No side effects.   Best to keep venlafaxine at current dose. Starting therapy soon and hoping that will be helpful .   Sleep not the greated.  Finally asleep around midnight.  Getting up is pretty bad.  Wake up and feels a little drugged.  Uses marijuana   Has upcoming appointment with primary next month as well as follow up with gastroenterologist 6/1/23             Review of Systems   Constitutional, HEENT, cardiovascular, pulmonary, gi and gu systems are negative, except as otherwise noted.      Objective    /70 (BP Location: Right arm, Patient Position: Sitting, Cuff Size: Adult Regular)   Pulse 96   Temp 99.2  F (37.3  C) (Oral)   Resp 16   Ht 1.549 m (5' 1\")   Wt 46.7 kg (103 lb)   LMP 04/07/2023   SpO2 99%   BMI 19.46 kg/m    Body mass index is 19.46 kg/m .  Physical Exam   GENERAL: healthy, alert and no distress  NECK: no adenopathy, no asymmetry, masses, or scars and thyroid normal to palpation  RESP: lungs clear to auscultation - no rales, rhonchi or wheezes  CV: regular rate and rhythm, normal S1 S2, no S3 or S4, no murmur, click or rub, no peripheral edema and peripheral pulses strong  ABDOMEN: tenderness LUQ, no organomegaly or masses, liver span normal to percussion and bowel sounds normal  MS: no gross musculoskeletal defects " noted, no edema  PSYCH: mentation appears normal, affect normal/bright, judgement and insight intact and appearance well groomed

## 2023-05-03 NOTE — TELEPHONE ENCOUNTER
rcvd completed leave of absence forms from Sera Hamilton PA-C. Forms were faxed to [phone]. Sent for scanning.  Ellie Zamarripa CMA

## 2023-05-03 NOTE — PATIENT INSTRUCTIONS
Keep upcoming appointments as scheduled  Return urgently if any change in symptoms like increasing pain, fever, vomiting or other change in symptoms

## 2023-05-17 ENCOUNTER — VIRTUAL VISIT (OUTPATIENT)
Dept: GASTROENTEROLOGY | Facility: CLINIC | Age: 23
End: 2023-05-17
Attending: PHYSICIAN ASSISTANT
Payer: COMMERCIAL

## 2023-05-17 DIAGNOSIS — R10.12 LUQ ABDOMINAL PAIN: Primary | ICD-10-CM

## 2023-05-17 PROCEDURE — 99213 OFFICE O/P EST LOW 20 MIN: CPT | Mod: 95 | Performed by: PHYSICIAN ASSISTANT

## 2023-05-17 RX ORDER — OMEPRAZOLE 40 MG/1
40 CAPSULE, DELAYED RELEASE ORAL DAILY
Qty: 90 CAPSULE | Refills: 0 | Status: SHIPPED | OUTPATIENT
Start: 2023-05-17

## 2023-05-17 ASSESSMENT — PAIN SCALES - GENERAL: PAINLEVEL: MILD PAIN (2)

## 2023-05-17 NOTE — PROGRESS NOTES
GI FOLLOW UP       CC/REFERRING MD:    VALENTINO Flanagan [facility]      REASON FOR VISIT: FOLLOW UP     HISTORY OF PRESENT ILLNESS:    Mery Doyle is 22 year old female who presents for follow-up.  I initially met with her last month for concerns with the left upper quadrant abdominal pain x 2 to 3 months.  Associated symptoms included nausea.  She did have some improvement with a trial of Pepcid through her primary care provider.  At her initial visit we recommended switching to omeprazole to see if this would lead to further improvement and also advised her to avoid and limit NSAIDs.  She has been on omeprazole 40 mg for the past month and does note that her pain has significantly improved. She does still have some LUQ abdominal pain in the mornings when she wakes up.  No longer having as much nausea.    Regards to her bowel movements she is taking MiraLAX daily which helps her completely evacuate.  She is not having any concerns with her bowel movements.    At our initial visit we also reviewed her mildly elevated liver enzymes.  We repeated her LFTs and they were within the normal range.  Thought to be a transient elevation.     ALLERGIES:  No known allergies      PERTINENT MEDICATIONS:    Current Outpatient Medications:      Famotidine (PEPCID AC MAXIMUM STRENGTH PO), , Disp: , Rfl:      omeprazole (PRILOSEC) 40 MG DR capsule, Take 1 capsule (40 mg) by mouth daily, Disp: 30 capsule, Rfl: 0     polyethylene glycol (MIRALAX) 17 GM/Dose powder, Miralax 17g 3 times daily for one day, Miralax 17 g (1 capful = 4 tsp) twice daily for 3 days, Then 17g once daily, Disp: 850 g, Rfl: 1     venlafaxine (EFFEXOR XR) 75 MG 24 hr capsule, Take 1 capsule (75 mg) by mouth daily, Disp: 90 capsule, Rfl: 0    PHYSICAL EXAMINATION:  Constitutional: aaox3, cooperative, pleasant, not dyspneic/diaphoretic, no acute distress      GENERAL: Healthy, alert and no distress  EYES: Eyes grossly normal to inspection.  No  discharge or erythema, or obvious scleral/conjunctival abnormalities.  RESP: No audible wheeze, cough, or visible cyanosis.  No visible retractions or increased work of breathing.    SKIN: Visible skin clear. No significant rash, abnormal pigmentation or lesions.  NEURO: Cranial nerves grossly intact.  Mentation and speech appropriate for age.  PSYCH: Mentation appears normal, affect normal/bright, judgement and insight intact, normal speech and appearance well-groomed.          PERTINENT STUDIES: (I personally reviewed these laboratory studies today)  Most recent CBC:   Recent Labs   Lab Test 03/17/23  1515 09/20/22  1425   WBC 6.2 7.1   HGB 14.8 14.5   HCT 45.0 42.4    223     Most recent hepatic panel:  Recent Labs   Lab Test 04/11/23  1402 03/17/23  1515   ALT 39 74*   AST 21 44*     Most recent creatinine:  Recent Labs   Lab Test 03/17/23  1515 09/20/22  1425   CR 0.46* 0.51*       TSH   Date Value Ref Range Status   03/17/2023 1.26 0.30 - 4.20 uIU/mL Final   09/20/2022 1.14 0.40 - 4.00 mU/L Final         RADIOLOGY:      XR ESOPHAGRAM W UPPER GI DOUBLE CONTRAST   4/18/2023 9:12 AM      HISTORY: Left upper quadrant pain.     COMPARISON: None.     TECHNIQUE: Double contrast technique was employed. 2.4 minutes of  fluoroscopy time was used. 13 spot images and 1 cine clip were  obtained.      FINDINGS: Esophagus is normal in its caliber, course and mucosal  pattern. No esophageal ulcerations. No strictures or masses are  evident. No significant esophageal dysmotility is appreciated. There  is no hiatal hernia.     The stomach is normal, without evidence for mass or erosions. Gastric  mucosal pattern is normal. Normal duodenum and proximal jejunum.     No gastroesophageal reflux was elicited during the exam.                                                                      IMPRESSION: Unremarkable upper GI examination. No cause for left upper  quadrant pain is identified.     MARVA MAY MD              ASSESSMENT/PLAN:    1. LUQ abdominal pain  - Adult GI Clinic Follow-Up Order (Blank)  - omeprazole (PRILOSEC) 40 MG DR capsule; Take 1 capsule (40 mg) by mouth daily  Dispense: 90 capsule; Refill: 0      Mery H Yanira is a 22 year old female who presents for follow-up.  She notes improvement in her left upper quadrant abdominal pain since starting omeprazole 40 mg daily.  She still has some abdominal pain in the mornings but reports that it is more manageable.  Nausea has resolved.  Advised that she can try taking Pepcid at night to see if this would help.  Recommended avoiding eating late close to bedtime. We will have her continue with the omeprazole for another 2 to 3 months and check back in at that time.    Follow up in 2-3 months, sooner if needed.     Thank you for this consultation.  It was a pleasure to participate in the care of this patient; please contact us with any further questions.        This note was created with voice recognition software, and while reviewed for accuracy, typos may remain.       23 minutes spent by me on the date of the encounter doing chart review, history and exam, documentation and further activities per the note      Viraj Vásquez PA-C  Division of Gastroenterology, Hepatology and Nutrition   [facility]            Video-Visit Details    Video Start Time: 10:51 AM    Type of service:  Video Visit    Video End Time:10:59 AM    Originating Location (pt. Location): Home    Distant Location (provider location):  Off-site    Platform used for Video Visit: [employer]

## 2023-05-17 NOTE — NURSING NOTE
Is the patient currently in the state of [place]? YES    Visit mode:VIDEO    If the visit is dropped, the patient can be reconnected by: VIDEO VISIT: Text to cell phone: [phone]    Will anyone else be joining the visit? NO      How would you like to obtain your AVS? MyChart    Are changes needed to the allergy or medication list? NO    Reason for visit: No chief complaint on file.

## 2023-05-17 NOTE — PATIENT INSTRUCTIONS
It was a pleasure taking care of you today.  I've included a brief summary of our discussion and care plan from today's visit below.  Please review this information with your primary care provider.  _______________________________________________________________________     My recommendations are summarized as follows:     - continue omeprazole 40 mg daily as you are doing for the next 2 to 3 months.  Consider adding in Pepcid at bedtime if needed  -Avoid eating late with close to bedtime.  Avoid/limit acidic foods.  -Continue to avoid or limit NSAIDs (ibuprofen, advil, aleve)  - follow up in 2-3 months   ______________________________________________________________________     How do I schedule labs, imaging studies, or procedures that were ordered in clinic today?      Labs: To schedule lab appointment you can contact your local [facility] or call [phone] to schedule at any convenient [facility] location.     Procedures: If a colonoscopy, upper endoscopy, breath test, esophageal manometry, or pH impedence was ordered today, our endoscopy team will call you to schedule this. If you have not heard from our endoscopy team within a week, please call [phone] to schedule.      Imaging Studies: If you were scheduled for a CT scan, X-ray, MRI, ultrasound, HIDA scan or other imaging study, please call [phone] to have this scheduled.      Referral: If a referral to another specialty was ordered, expect a phone call or follow instructions above. If you have not heard from anyone regarding your referral in a week, please call our clinic to check the status.      Who do I call with any questions after my visit?  Please be in touch if there are any further questions that arise following today's visit.  There are multiple ways to contact your gastroenterology care team.       During business hours, you may reach a Gastroenterology nurse at [phone]     To schedule or reschedule an  appointment, please call [phone].      You can always send a secure message through [employer].  [employer] messages are answered by your nurse or doctor typically within 24 hours.  Please allow extra time on weekends and holidays.       For urgent/emergent questions after business hours, you may reach the on-call GI Fellow by contacting the [facility]  at [phone].     How will I get the results of any tests ordered?    You will receive all of your results.  If you have signed up for [employer], any tests ordered at your visit will be available to you after your physician reviews them.  Typically this takes 1-2 weeks.  If there are urgent results that require a change in your care plan, your physician or nurse will call you to discuss the next steps.       What is [employer]?  [employer] is a secure way for you to access all of your healthcare records from the [facility].  It is a web based computer program, so you can sign on to it from any location.  It also allows you to send secure messages to your care team.  I recommend signing up for [employer] access if you have not already done so and are comfortable with using a computer.       How to I schedule a follow-up visit?  If you did not schedule a follow-up visit today, please call [phone] to schedule a follow-up office visit.      Viraj Vásquez PA-C  Division of Gastroenterology, Hepatology and Nutrition   [facility]

## 2023-05-19 ENCOUNTER — TELEPHONE (OUTPATIENT)
Dept: GASTROENTEROLOGY | Facility: CLINIC | Age: 23
End: 2023-05-19
Payer: COMMERCIAL

## 2023-05-19 NOTE — TELEPHONE ENCOUNTER
No voicemail set up.     Appointment type: 3 month follow up video  Provider: Viraj Vásquez PA-C  Return date: August 2023  Specialty phone number: n/a  Additional appointment(s) needed: n/a  Additonal Notes: n/a

## 2023-05-31 SDOH — ECONOMIC STABILITY: FOOD INSECURITY: WITHIN THE PAST 12 MONTHS, THE FOOD YOU BOUGHT JUST DIDN'T LAST AND YOU DIDN'T HAVE MONEY TO GET MORE.: NEVER TRUE

## 2023-05-31 SDOH — ECONOMIC STABILITY: FOOD INSECURITY: WITHIN THE PAST 12 MONTHS, YOU WORRIED THAT YOUR FOOD WOULD RUN OUT BEFORE YOU GOT MONEY TO BUY MORE.: NEVER TRUE

## 2023-05-31 SDOH — ECONOMIC STABILITY: TRANSPORTATION INSECURITY
IN THE PAST 12 MONTHS, HAS LACK OF TRANSPORTATION KEPT YOU FROM MEETINGS, WORK, OR FROM GETTING THINGS NEEDED FOR DAILY LIVING?: NO

## 2023-05-31 SDOH — HEALTH STABILITY: PHYSICAL HEALTH: ON AVERAGE, HOW MANY MINUTES DO YOU ENGAGE IN EXERCISE AT THIS LEVEL?: 30 MIN

## 2023-05-31 SDOH — ECONOMIC STABILITY: INCOME INSECURITY: IN THE LAST 12 MONTHS, WAS THERE A TIME WHEN YOU WERE NOT ABLE TO PAY THE MORTGAGE OR RENT ON TIME?: PATIENT REFUSED

## 2023-05-31 SDOH — HEALTH STABILITY: PHYSICAL HEALTH: ON AVERAGE, HOW MANY DAYS PER WEEK DO YOU ENGAGE IN MODERATE TO STRENUOUS EXERCISE (LIKE A BRISK WALK)?: 4 DAYS

## 2023-05-31 SDOH — ECONOMIC STABILITY: TRANSPORTATION INSECURITY
IN THE PAST 12 MONTHS, HAS THE LACK OF TRANSPORTATION KEPT YOU FROM MEDICAL APPOINTMENTS OR FROM GETTING MEDICATIONS?: NO

## 2023-05-31 SDOH — ECONOMIC STABILITY: INCOME INSECURITY: HOW HARD IS IT FOR YOU TO PAY FOR THE VERY BASICS LIKE FOOD, HOUSING, MEDICAL CARE, AND HEATING?: PATIENT DECLINED

## 2023-05-31 ASSESSMENT — SOCIAL DETERMINANTS OF HEALTH (SDOH)
DO YOU BELONG TO ANY CLUBS OR ORGANIZATIONS SUCH AS CHURCH GROUPS UNIONS, FRATERNAL OR ATHLETIC GROUPS, OR SCHOOL GROUPS?: NO
IN A TYPICAL WEEK, HOW MANY TIMES DO YOU TALK ON THE PHONE WITH FAMILY, FRIENDS, OR NEIGHBORS?: MORE THAN THREE TIMES A WEEK
HOW OFTEN DO YOU GET TOGETHER WITH FRIENDS OR RELATIVES?: THREE TIMES A WEEK
HOW OFTEN DO YOU ATTEND CHURCH OR RELIGIOUS SERVICES?: NEVER
ARE YOU MARRIED, WIDOWED, DIVORCED, SEPARATED, NEVER MARRIED, OR LIVING WITH A PARTNER?: NEVER MARRIED

## 2023-05-31 ASSESSMENT — LIFESTYLE VARIABLES
AUDIT-C TOTAL SCORE: 2
HOW OFTEN DO YOU HAVE SIX OR MORE DRINKS ON ONE OCCASION: NEVER
HOW MANY STANDARD DRINKS CONTAINING ALCOHOL DO YOU HAVE ON A TYPICAL DAY: 3 OR 4
HOW OFTEN DO YOU HAVE A DRINK CONTAINING ALCOHOL: MONTHLY OR LESS
SKIP TO QUESTIONS 9-10: 0

## 2023-05-31 ASSESSMENT — ENCOUNTER SYMPTOMS
HEARTBURN: 0
HEMATURIA: 0
NERVOUS/ANXIOUS: 0
PARESTHESIAS: 0
JOINT SWELLING: 0
FEVER: 0
COUGH: 0
EYE PAIN: 0
HEMATOCHEZIA: 0
DIZZINESS: 0
NAUSEA: 0
DYSURIA: 0
SORE THROAT: 0
DIARRHEA: 0
ARTHRALGIAS: 0
ABDOMINAL PAIN: 0
PALPITATIONS: 0
CHILLS: 0
WEAKNESS: 0
FREQUENCY: 0
BREAST MASS: 0
MYALGIAS: 0
CONSTIPATION: 0
SHORTNESS OF BREATH: 0
HEADACHES: 0

## 2023-06-01 ENCOUNTER — OFFICE VISIT (OUTPATIENT)
Dept: FAMILY MEDICINE | Facility: CLINIC | Age: 23
End: 2023-06-01
Attending: FAMILY MEDICINE
Payer: COMMERCIAL

## 2023-06-01 VITALS
BODY MASS INDEX: 18.6 KG/M2 | WEIGHT: 98.5 LBS | OXYGEN SATURATION: 98 % | SYSTOLIC BLOOD PRESSURE: 129 MMHG | HEART RATE: 102 BPM | HEIGHT: 61 IN | TEMPERATURE: 98.4 F | RESPIRATION RATE: 13 BRPM | DIASTOLIC BLOOD PRESSURE: 86 MMHG

## 2023-06-01 DIAGNOSIS — Z11.3 SCREENING FOR STDS (SEXUALLY TRANSMITTED DISEASES): ICD-10-CM

## 2023-06-01 DIAGNOSIS — U07.0 VAPING-RELATED DISORDER: ICD-10-CM

## 2023-06-01 DIAGNOSIS — F32.1 MAJOR DEPRESSIVE DISORDER, SINGLE EPISODE, MODERATE (H): ICD-10-CM

## 2023-06-01 DIAGNOSIS — Z00.00 ROUTINE GENERAL MEDICAL EXAMINATION AT A HEALTH CARE FACILITY: Primary | ICD-10-CM

## 2023-06-01 DIAGNOSIS — F41.1 GAD (GENERALIZED ANXIETY DISORDER): ICD-10-CM

## 2023-06-01 DIAGNOSIS — E55.9 VITAMIN D DEFICIENCY: ICD-10-CM

## 2023-06-01 PROCEDURE — 99395 PREV VISIT EST AGE 18-39: CPT | Performed by: NURSE PRACTITIONER

## 2023-06-01 PROCEDURE — 87491 CHLMYD TRACH DNA AMP PROBE: CPT | Performed by: NURSE PRACTITIONER

## 2023-06-01 PROCEDURE — 36415 COLL VENOUS BLD VENIPUNCTURE: CPT | Performed by: NURSE PRACTITIONER

## 2023-06-01 PROCEDURE — 87591 N.GONORRHOEAE DNA AMP PROB: CPT | Performed by: NURSE PRACTITIONER

## 2023-06-01 PROCEDURE — 82306 VITAMIN D 25 HYDROXY: CPT | Performed by: NURSE PRACTITIONER

## 2023-06-01 ASSESSMENT — ENCOUNTER SYMPTOMS
HEMATOCHEZIA: 0
NERVOUS/ANXIOUS: 0
WEAKNESS: 0
COUGH: 0
CHILLS: 0
NAUSEA: 0
JOINT SWELLING: 0
HEARTBURN: 0
MYALGIAS: 0
CONSTIPATION: 0
EYE PAIN: 0
ABDOMINAL PAIN: 0
DYSURIA: 0
DIARRHEA: 0
FEVER: 0
FREQUENCY: 0
PARESTHESIAS: 0
HEADACHES: 0
ARTHRALGIAS: 0
PALPITATIONS: 0
SHORTNESS OF BREATH: 0
DIZZINESS: 0
HEMATURIA: 0
BREAST MASS: 0
SORE THROAT: 0

## 2023-06-01 NOTE — PROGRESS NOTES
SUBJECTIVE:   CC: Mery is an 22 year old who presents for preventive health visit.       6/1/2023     2:54 PM   Additional Questions   Roomed by Magnolia Cantu     Healthy Habits:    Getting at least 3 servings of Calcium per day:  Yes    Bi-annual eye exam:  NO    Dental care twice a year:  NO    Sleep apnea or symptoms of sleep apnea:  Daytime drowsiness    Diet:  Regular (no restrictions)    Frequency of exercise:  2-3 days/week    Duration of exercise:  30-45 minutes    Taking medications regularly:  Yes    Medication side effects:  Other    PHQ-2 Total Score:    Additional concerns today:  No    Periods regular and manageable.   Female partners.     Daily vaping, nicotine. Working to taper off.         11/11/2022     1:15 PM 3/17/2023     1:45 PM 5/3/2023    12:02 PM   PHQ   PHQ-9 Total Score 11 15 9   Q9: Thoughts of better off dead/self-harm past 2 weeks Not at all Not at all Not at all             Social History     Tobacco Use     Smoking status: Former     Types: Vaping Device     Passive exposure: Yes     Smokeless tobacco: Current     Tobacco comments:     dad smokes     Former cigarette smoker- currently vapes    Vaping Use     Vaping status: Every Day     Substances: Nicotine     Devices: [employer]onble tank   Substance Use Topics     Alcohol use: Yes     Comment: maybe 1 drink a week             5/31/2023     3:46 PM   Alcohol Use   Prescreen: >3 drinks/day or >7 drinks/week? No     Reviewed orders with patient.  Reviewed health maintenance and updated orders accordingly - Yes  Lab work is in process  Labs reviewed in EPIC    Breast Cancer Screening:        History of abnormal Pap smear:      Reviewed and updated as needed this visit by clinical staff   Tobacco  Allergies  Meds  Problems  Med Hx  Surg Hx  Fam Hx          Reviewed and updated as needed this visit by Provider   Tobacco  Allergies  Meds  Problems  Med Hx  Surg Hx  Fam Hx         Past Medical History:   Diagnosis Date      "Asthma      Infectious mononucleosis 11/25/2011     Intermittent asthma 4/4/2011     Strep throat      Tonsillar and adenoid hypertrophy 1/16/2013      Past Surgical History:   Procedure Laterality Date     TONSILLECTOMY, ADENOIDECTOMY, COMBINED  1/21/2013    Procedure: COMBINED TONSILLECTOMY, ADENOIDECTOMY;  TONSILLECTOMY,ADENOIDECTOMY ;  Surgeon: Ponce Ga MD;  Location:  OR       Review of Systems   Constitutional: Negative for chills and fever.   HENT: Negative for congestion, ear pain, hearing loss and sore throat.    Eyes: Negative for pain and visual disturbance.   Respiratory: Negative for cough and shortness of breath.    Cardiovascular: Negative for chest pain, palpitations and peripheral edema.   Gastrointestinal: Negative for abdominal pain, constipation, diarrhea, heartburn, hematochezia and nausea.   Breasts:  Negative for tenderness, breast mass and discharge.   Genitourinary: Negative for dysuria, frequency, genital sores, hematuria, pelvic pain, urgency, vaginal bleeding and vaginal discharge.   Musculoskeletal: Negative for arthralgias, joint swelling and myalgias.   Skin: Negative for rash.   Neurological: Negative for dizziness, weakness, headaches and paresthesias.   Psychiatric/Behavioral: Negative for mood changes. The patient is not nervous/anxious.           OBJECTIVE:   /86 (BP Location: Right arm, Patient Position: Sitting, Cuff Size: Adult Regular)   Pulse 102   Temp 98.4  F (36.9  C) (Oral)   Resp 13   Ht 1.549 m (5' 1\")   Wt 44.7 kg (98 lb 8 oz)   LMP 05/25/2023 (Approximate)   SpO2 98%   BMI 18.61 kg/m    Physical Exam  GENERAL: healthy, alert and no distress  EYES: Eyes grossly normal to inspection, PERRL and conjunctivae and sclerae normal  HENT: ear canals and TM's normal, nose and mouth without ulcers or lesions  NECK: no adenopathy, no asymmetry, masses, or scars and thyroid normal to palpation  RESP: lungs clear to auscultation - no rales, rhonchi or " wheezes  CV: regular rate and rhythm, normal S1 S2, no S3 or S4, no murmur, click or rub, no peripheral edema and peripheral pulses strong  ABDOMEN: soft, nontender, no hepatosplenomegaly, no masses and bowel sounds normal  MS: no gross musculoskeletal defects noted, no edema  SKIN: no suspicious lesions or rashes  NEURO: Normal strength and tone, mentation intact and speech normal  PSYCH: mentation appears normal, affect normal/bright    Diagnostic Test Results:  Labs reviewed in Epic    ASSESSMENT/PLAN:   Mery was seen today for physical and health maintenance.    Diagnoses and all orders for this visit:    Routine general medical examination at a health care facility  Defers pap smear today.     Screening for STDs (sexually transmitted diseases)  -     Chlamydia & Gonorrhea by PCR, [facility] Collect  Screening.     Vitamin D deficiency  -     Vitamin D Deficiency; Future  -     Vitamin D Deficiency  History of vitamin D level of 11, no supplementation.   Recheck today.   Recommended vitamin D 2000 units/day.     Major depressive disorder, single episode, moderate (H)  CRISTIAN (generalized anxiety disorder)  Stable with venlafaxine.   Recommended vaping cessation.     Vaping-related disorder  Strong recommendations for cessation. Patient working to taper on own. Will follow-up if not able to stop.     Other orders  -     PRIMARY CARE FOLLOW-UP SCHEDULING              COUNSELING:  Reviewed preventive health counseling, as reflected in patient instructions        She reports that she has quit smoking. Her smoking use included vaping device. She has been exposed to tobacco smoke. She uses smokeless tobacco.        MAI Mujica [facility]

## 2023-06-02 LAB
C TRACH DNA SPEC QL PROBE+SIG AMP: NEGATIVE
DEPRECATED CALCIDIOL+CALCIFEROL SERPL-MC: 11 UG/L (ref 20–75)
N GONORRHOEA DNA SPEC QL NAA+PROBE: NEGATIVE

## 2023-06-05 DIAGNOSIS — E55.9 VITAMIN D DEFICIENCY: Primary | ICD-10-CM

## 2023-06-16 ENCOUNTER — MYC REFILL (OUTPATIENT)
Dept: FAMILY MEDICINE | Facility: CLINIC | Age: 23
End: 2023-06-16
Payer: COMMERCIAL

## 2023-06-16 DIAGNOSIS — E55.9 VITAMIN D DEFICIENCY: ICD-10-CM

## 2024-05-02 ENCOUNTER — PATIENT OUTREACH (OUTPATIENT)
Dept: CARE COORDINATION | Facility: CLINIC | Age: 24
End: 2024-05-02

## 2024-05-16 ENCOUNTER — PATIENT OUTREACH (OUTPATIENT)
Dept: CARE COORDINATION | Facility: CLINIC | Age: 24
End: 2024-05-16

## 2024-08-25 ENCOUNTER — HEALTH MAINTENANCE LETTER (OUTPATIENT)
Age: 24
End: 2024-08-25